# Patient Record
Sex: MALE | Race: WHITE | Employment: FULL TIME | URBAN - METROPOLITAN AREA
[De-identification: names, ages, dates, MRNs, and addresses within clinical notes are randomized per-mention and may not be internally consistent; named-entity substitution may affect disease eponyms.]

---

## 2017-05-31 DIAGNOSIS — M48.00 CENTRAL SPINAL STENOSIS: ICD-10-CM

## 2017-06-01 RX ORDER — CYCLOBENZAPRINE HCL 10 MG
TABLET ORAL
Qty: 20 TABLET | Refills: 0 | Status: SHIPPED | OUTPATIENT
Start: 2017-06-01 | End: 2017-06-28 | Stop reason: SDUPTHER

## 2017-06-28 ENCOUNTER — OFFICE VISIT (OUTPATIENT)
Dept: FAMILY MEDICINE CLINIC | Age: 42
End: 2017-06-28
Payer: COMMERCIAL

## 2017-06-28 VITALS
WEIGHT: 282 LBS | TEMPERATURE: 97.8 F | RESPIRATION RATE: 18 BRPM | SYSTOLIC BLOOD PRESSURE: 118 MMHG | BODY MASS INDEX: 36.19 KG/M2 | DIASTOLIC BLOOD PRESSURE: 64 MMHG | HEIGHT: 74 IN
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
## 2017-06-28 DIAGNOSIS — M54.6 ACUTE MIDLINE THORACIC BACK PAIN: ICD-10-CM

## 2017-06-28 DIAGNOSIS — M54.50 ACUTE MIDLINE LOW BACK PAIN WITHOUT SCIATICA: ICD-10-CM

## 2017-06-28 PROCEDURE — 99213 OFFICE O/P EST LOW 20 MIN: CPT | Performed by: NURSE PRACTITIONER

## 2017-06-28 RX ORDER — OXYCODONE HYDROCHLORIDE AND ACETAMINOPHEN 5; 325 MG/1; MG/1
1 TABLET ORAL
COMMUNITY
Start: 2017-06-20 | End: 2017-07-03 | Stop reason: CLARIF

## 2017-06-28 RX ORDER — CYCLOBENZAPRINE HCL 10 MG
10 TABLET ORAL
COMMUNITY
Start: 2017-06-20 | End: 2017-08-03 | Stop reason: SDUPTHER

## 2017-06-28 RX ORDER — IBUPROFEN 200 MG
200 TABLET ORAL
COMMUNITY
End: 2017-06-28 | Stop reason: SDUPTHER

## 2017-06-28 ASSESSMENT — ENCOUNTER SYMPTOMS
BACK PAIN: 1
SHORTNESS OF BREATH: 0
BOWEL INCONTINENCE: 0
ABDOMINAL PAIN: 0

## 2017-07-03 ENCOUNTER — TELEPHONE (OUTPATIENT)
Dept: FAMILY MEDICINE CLINIC | Age: 42
End: 2017-07-03
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
## 2017-07-03 RX ORDER — ACETAMINOPHEN AND CODEINE PHOSPHATE 300; 30 MG/1; MG/1
1-2 TABLET ORAL 2 TIMES DAILY PRN
Qty: 12 TABLET | Refills: 0 | Status: SHIPPED | OUTPATIENT
Start: 2017-07-03 | End: 2017-07-10 | Stop reason: SDUPTHER

## 2017-07-07 ENCOUNTER — TELEPHONE (OUTPATIENT)
Dept: FAMILY MEDICINE CLINIC | Age: 42
End: 2017-07-07

## 2017-07-10 ENCOUNTER — TELEPHONE (OUTPATIENT)
Dept: FAMILY MEDICINE CLINIC | Age: 42
End: 2017-07-10

## 2017-07-10 RX ORDER — ACETAMINOPHEN AND CODEINE PHOSPHATE 300; 30 MG/1; MG/1
1-2 TABLET ORAL 2 TIMES DAILY PRN
Qty: 12 TABLET | Refills: 0 | Status: SHIPPED | OUTPATIENT
Start: 2017-07-10 | End: 2018-01-16 | Stop reason: ALTCHOICE

## 2017-07-17 ENCOUNTER — TELEPHONE (OUTPATIENT)
Dept: FAMILY MEDICINE CLINIC | Age: 42
End: 2017-07-17

## 2017-07-20 ENCOUNTER — TELEPHONE (OUTPATIENT)
Dept: FAMILY MEDICINE CLINIC | Age: 42
End: 2017-07-20

## 2017-08-03 ENCOUNTER — OFFICE VISIT (OUTPATIENT)
Dept: FAMILY MEDICINE CLINIC | Age: 42
End: 2017-08-03
Payer: COMMERCIAL

## 2017-08-03 VITALS
WEIGHT: 289.2 LBS | TEMPERATURE: 96.2 F | SYSTOLIC BLOOD PRESSURE: 124 MMHG | BODY MASS INDEX: 37.12 KG/M2 | HEART RATE: 64 BPM | DIASTOLIC BLOOD PRESSURE: 82 MMHG | OXYGEN SATURATION: 98 % | HEIGHT: 74 IN

## 2017-08-03 DIAGNOSIS — M54.50 ACUTE LOW BACK PAIN DUE TO TRAUMA: Primary | ICD-10-CM

## 2017-08-03 DIAGNOSIS — M54.6 ACUTE MIDLINE THORACIC BACK PAIN: ICD-10-CM

## 2017-08-03 DIAGNOSIS — G89.11 ACUTE LOW BACK PAIN DUE TO TRAUMA: Primary | ICD-10-CM

## 2017-08-03 PROCEDURE — 99213 OFFICE O/P EST LOW 20 MIN: CPT | Performed by: NURSE PRACTITIONER

## 2017-08-03 RX ORDER — CYCLOBENZAPRINE HCL 10 MG
10 TABLET ORAL 3 TIMES DAILY PRN
Qty: 60 TABLET | Refills: 1 | Status: SHIPPED | OUTPATIENT
Start: 2017-08-03 | End: 2018-01-08 | Stop reason: SDUPTHER

## 2017-08-03 ASSESSMENT — ENCOUNTER SYMPTOMS
BOWEL INCONTINENCE: 0
ABDOMINAL PAIN: 0
BACK PAIN: 1
SHORTNESS OF BREATH: 0

## 2017-11-10 ENCOUNTER — NURSE ONLY (OUTPATIENT)
Dept: FAMILY MEDICINE CLINIC | Age: 42
End: 2017-11-10
Payer: COMMERCIAL

## 2017-11-10 VITALS — TEMPERATURE: 96.6 F

## 2017-11-10 DIAGNOSIS — Z23 FLU VACCINE NEED: Primary | ICD-10-CM

## 2017-11-10 PROCEDURE — 90471 IMMUNIZATION ADMIN: CPT | Performed by: NURSE PRACTITIONER

## 2017-11-10 PROCEDURE — 90630 INFLUENZA, QUADV, 18-64 YRS, ID, PF, MICRO INJ, 0.1ML (FLUZONE QUADV, PF): CPT | Performed by: NURSE PRACTITIONER

## 2017-11-10 NOTE — PROGRESS NOTES
After obtaining consent, and per orders of Vladimir Ibrahim CNP, injection of flu vaccine given in Left deltoid by Haris Hare. Patient instructed to remain in clinic for 20 minutes afterwards, and to report any adverse reaction to me immediately.

## 2018-01-08 RX ORDER — CYCLOBENZAPRINE HCL 10 MG
TABLET ORAL
Qty: 60 TABLET | Refills: 0 | Status: SHIPPED | OUTPATIENT
Start: 2018-01-08 | End: 2018-03-19 | Stop reason: SDUPTHER

## 2018-01-16 ENCOUNTER — OFFICE VISIT (OUTPATIENT)
Dept: FAMILY MEDICINE CLINIC | Age: 43
End: 2018-01-16
Payer: COMMERCIAL

## 2018-01-16 VITALS
TEMPERATURE: 99 F | WEIGHT: 292 LBS | RESPIRATION RATE: 18 BRPM | DIASTOLIC BLOOD PRESSURE: 76 MMHG | HEART RATE: 106 BPM | OXYGEN SATURATION: 98 % | BODY MASS INDEX: 37.47 KG/M2 | SYSTOLIC BLOOD PRESSURE: 134 MMHG | HEIGHT: 74 IN

## 2018-01-16 DIAGNOSIS — M79.18 MYOFASCIAL PAIN DYSFUNCTION SYNDROME: ICD-10-CM

## 2018-01-16 DIAGNOSIS — J34.89 SINUS PAIN: ICD-10-CM

## 2018-01-16 DIAGNOSIS — M26.69: ICD-10-CM

## 2018-01-16 DIAGNOSIS — R50.9 FEVER, UNSPECIFIED FEVER CAUSE: ICD-10-CM

## 2018-01-16 DIAGNOSIS — J34.89 SINUS PRESSURE: ICD-10-CM

## 2018-01-16 DIAGNOSIS — R05.9 COUGH: ICD-10-CM

## 2018-01-16 DIAGNOSIS — R52 BODY ACHES: ICD-10-CM

## 2018-01-16 DIAGNOSIS — R68.83 CHILLS: ICD-10-CM

## 2018-01-16 DIAGNOSIS — J01.11 ACUTE RECURRENT FRONTAL SINUSITIS: Primary | ICD-10-CM

## 2018-01-16 LAB
INFLUENZA A ANTIBODY: NEGATIVE
INFLUENZA B ANTIBODY: NEGATIVE

## 2018-01-16 PROCEDURE — 99213 OFFICE O/P EST LOW 20 MIN: CPT | Performed by: INTERNAL MEDICINE

## 2018-01-16 PROCEDURE — 87804 INFLUENZA ASSAY W/OPTIC: CPT | Performed by: INTERNAL MEDICINE

## 2018-01-16 RX ORDER — AMOXICILLIN AND CLAVULANATE POTASSIUM 875; 125 MG/1; MG/1
1 TABLET, FILM COATED ORAL 2 TIMES DAILY
Qty: 20 TABLET | Refills: 0 | Status: SHIPPED | OUTPATIENT
Start: 2018-01-16 | End: 2018-01-26

## 2018-01-16 ASSESSMENT — ENCOUNTER SYMPTOMS
SWOLLEN GLANDS: 1
SINUS PAIN: 1
NAUSEA: 0
VOICE CHANGE: 0
COLOR CHANGE: 0
STRIDOR: 0
WHEEZING: 0
VISUAL CHANGE: 0
ABDOMINAL PAIN: 0
CHEST TIGHTNESS: 0
SINUS PRESSURE: 1
TROUBLE SWALLOWING: 0
SHORTNESS OF BREATH: 0
SORE THROAT: 1
COUGH: 1
FACIAL SWELLING: 0
CHANGE IN BOWEL HABIT: 0
VOMITING: 0
RHINORRHEA: 0

## 2018-01-16 ASSESSMENT — PATIENT HEALTH QUESTIONNAIRE - PHQ9
SUM OF ALL RESPONSES TO PHQ9 QUESTIONS 1 & 2: 0
2. FEELING DOWN, DEPRESSED OR HOPELESS: 0
1. LITTLE INTEREST OR PLEASURE IN DOING THINGS: 0
SUM OF ALL RESPONSES TO PHQ QUESTIONS 1-9: 0

## 2018-01-16 NOTE — PROGRESS NOTES
color change and rash. Neurological: Positive for headaches. Negative for vertigo, weakness and numbness. Objective:     Physical Exam   Constitutional: He is oriented to person, place, and time. He appears well-developed and well-nourished. He is active. Non-toxic appearance. He has a sickly appearance. He appears ill. No distress. HENT:   Right Ear: Tympanic membrane, external ear and ear canal normal.   Left Ear: Tympanic membrane, external ear and ear canal normal.   Nose: Mucosal edema and rhinorrhea present. Right sinus exhibits no maxillary sinus tenderness and no frontal sinus tenderness. Left sinus exhibits maxillary sinus tenderness and frontal sinus tenderness. Mouth/Throat: Uvula is midline. Mucous membranes are dry. Posterior oropharyngeal edema and posterior oropharyngeal erythema present. Cardiovascular: Regular rhythm, S1 normal, S2 normal and normal heart sounds. Occasional extrasystoles are present. Tachycardia present. Exam reveals no gallop. No murmur heard. Pulmonary/Chest: Effort normal and breath sounds normal. No tachypnea. He has no decreased breath sounds. He has no wheezes. He has no rhonchi. He has no rales. Abdominal: Soft. Bowel sounds are normal. There is no splenomegaly or hepatomegaly. There is no tenderness. Lymphadenopathy:        Head (left side): Submental and submandibular adenopathy present. He has cervical adenopathy. Left cervical: Superficial cervical adenopathy present. Neurological: He is alert and oriented to person, place, and time. Skin: Skin is warm and dry. No rash noted. Psychiatric: He has a normal mood and affect. Nursing note and vitals reviewed. /76 (Site: Left Arm, Position: Sitting, Cuff Size: Large Adult)   Pulse 106   Temp 99 °F (37.2 °C) (Tympanic)   Resp 18   Ht 6' 2.02\" (1.88 m)   Wt 292 lb (132.5 kg)   SpO2 98%   BMI 37.47 kg/m²     Assessment:      1. Acute recurrent frontal sinusitis     2. Body aches  POCT Influenza A/B   3. Chills     4. Fever, unspecified fever cause     5. Sinus pain     6. Sinus pressure     7. Cough     8. Myofascial pain dysfunction syndrome  External Referral To Oral Maxillofacial Surgery   9. Mandibular dysfunction  External Referral To Oral Maxillofacial Surgery             Plan:      Return if symptoms worsen or fail to improve. Orders Placed This Encounter   Procedures    External Referral To Oral Maxillofacial Surgery     Referral Priority:   Routine     Referral Type:   Consult for Advice and Opinion     Referral Reason:   Specialty Services Required     Referred to Provider:   [address] [place] [address],[address] Department Of Oral And Maxillofacial Surgery/Dentistry     Requested Specialty:   Oral Surgery     Number of Visits Requested:   1    POCT Influenza A/B     Orders Placed This Encounter   Medications    amoxicillin-clavulanate (AUGMENTIN) 875-125 MG per tablet     Sig: Take 1 tablet by mouth 2 times daily for 10 days     Dispense:  20 tablet     Refill:  0    Influenza Neg. Patient sensitive to medications so treated as acute case of sinusitis but not given any other medication besides Augmentin  . Advised pt to use jose m pot and humidifier. Also try saline nasal spray and can alternate Advil and tylenol for body aches and /or fevers. Plenty of rest and fluids  Call if symptoms persist or worsen. Patient given educational materials - see patient instructions. Discussed use, benefit, and side effects of prescribed medications. All patient questions answered. Pt voiced understanding. Reviewed health maintenance. Instructed to continue current medications, diet and exercise. Patient agreed with treatment plan. Follow up as directed.      Electronically signed by Ed Guaman DO on 1/16/2018 at 4:50 PM

## 2018-03-19 RX ORDER — CYCLOBENZAPRINE HCL 10 MG
TABLET ORAL
Qty: 60 TABLET | Refills: 0 | Status: SHIPPED | OUTPATIENT
Start: 2018-03-19 | End: 2018-05-17 | Stop reason: SDUPTHER

## 2018-03-22 ENCOUNTER — OFFICE VISIT (OUTPATIENT)
Dept: FAMILY MEDICINE CLINIC | Age: 43
End: 2018-03-22
Payer: COMMERCIAL

## 2018-03-22 VITALS
HEIGHT: 74 IN | OXYGEN SATURATION: 97 % | DIASTOLIC BLOOD PRESSURE: 72 MMHG | SYSTOLIC BLOOD PRESSURE: 116 MMHG | TEMPERATURE: 99 F | HEART RATE: 101 BPM | WEIGHT: 292 LBS | BODY MASS INDEX: 37.47 KG/M2

## 2018-03-22 DIAGNOSIS — R50.9 FEVER, UNSPECIFIED FEVER CAUSE: ICD-10-CM

## 2018-03-22 DIAGNOSIS — J01.11 ACUTE RECURRENT FRONTAL SINUSITIS: Primary | ICD-10-CM

## 2018-03-22 LAB
INFLUENZA A ANTIBODY: NEGATIVE
INFLUENZA B ANTIBODY: NEGATIVE

## 2018-03-22 PROCEDURE — 99213 OFFICE O/P EST LOW 20 MIN: CPT | Performed by: NURSE PRACTITIONER

## 2018-03-22 PROCEDURE — 87804 INFLUENZA ASSAY W/OPTIC: CPT | Performed by: NURSE PRACTITIONER

## 2018-03-22 RX ORDER — CEFUROXIME AXETIL 500 MG/1
500 TABLET ORAL 2 TIMES DAILY
Qty: 20 TABLET | Refills: 0 | Status: SHIPPED | OUTPATIENT
Start: 2018-03-22 | End: 2018-04-01

## 2018-03-22 ASSESSMENT — ENCOUNTER SYMPTOMS
VOMITING: 0
RHINORRHEA: 1
SHORTNESS OF BREATH: 0
SORE THROAT: 1
ABDOMINAL PAIN: 0
SINUS PAIN: 1
SINUS COMPLAINT: 1
COUGH: 1
NAUSEA: 1
CHEST TIGHTNESS: 0
SINUS PRESSURE: 1
TROUBLE SWALLOWING: 0

## 2018-03-22 NOTE — PROGRESS NOTES
(ADVIL;MOTRIN) 800 MG tablet       levothyroxine (SYNTHROID) 150 MCG tablet        Current Facility-Administered Medications   Medication Dose Route Frequency Provider Last Rate Last Dose    albuterol (PROVENTIL) nebulizer solution 2.5 mg  2.5 mg Nebulization Q6H PRN Anisha Hollis CNP         Allergies   Allergen Reactions    Levaquin [Levofloxacin] Anaphylaxis, Hives, Other (See Comments) and Rash    Augmentin [Amoxicillin-Pot Clavulanate] Hives    Gatifloxacin      Hives    Other Hives and Other (See Comments)     tequin       Health Maintenance   Topic Date Due    Diabetes screen  04/07/2019    DTaP/Tdap/Td vaccine (2 - Td) 03/01/2020    Lipid screen  04/07/2021    Flu vaccine  Completed    HIV screen  Addressed       Subjective:      Review of Systems   Constitutional: Positive for activity change, appetite change, chills, fatigue and fever. Negative for diaphoresis and unexpected weight change. HENT: Positive for congestion, postnasal drip, rhinorrhea, sinus pain, sinus pressure and sore throat. Negative for ear discharge, ear pain, hearing loss, sneezing and trouble swallowing. Respiratory: Positive for cough. Negative for chest tightness and shortness of breath. Cardiovascular: Negative for chest pain and palpitations. Gastrointestinal: Positive for nausea. Negative for abdominal pain and vomiting. Neurological: Positive for light-headedness and headaches. Negative for dizziness. Hematological: Negative for adenopathy. Psychiatric/Behavioral: Positive for sleep disturbance. Objective:     Physical Exam   Constitutional: He is oriented to person, place, and time. He appears well-developed and well-nourished. No distress. /72 (Site: Right Arm, Position: Sitting, Cuff Size: Medium Adult)   Pulse 101   Temp 99 °F (37.2 °C) (Tympanic)   Ht 6' 2.02\" (1.88 m)   Wt 292 lb (132.5 kg)   SpO2 97%   BMI 37.47 kg/m²      HENT:   Head: Normocephalic and atraumatic.    Right

## 2018-04-09 ENCOUNTER — PATIENT MESSAGE (OUTPATIENT)
Dept: FAMILY MEDICINE CLINIC | Age: 43
End: 2018-04-09

## 2018-04-09 ENCOUNTER — OFFICE VISIT (OUTPATIENT)
Dept: FAMILY MEDICINE CLINIC | Age: 43
End: 2018-04-09
Payer: COMMERCIAL

## 2018-04-09 VITALS
SYSTOLIC BLOOD PRESSURE: 116 MMHG | DIASTOLIC BLOOD PRESSURE: 82 MMHG | OXYGEN SATURATION: 98 % | HEIGHT: 74 IN | TEMPERATURE: 98 F | HEART RATE: 83 BPM | WEIGHT: 287 LBS | BODY MASS INDEX: 36.83 KG/M2

## 2018-04-09 DIAGNOSIS — R51.9 ACUTE NONINTRACTABLE HEADACHE, UNSPECIFIED HEADACHE TYPE: ICD-10-CM

## 2018-04-09 DIAGNOSIS — R05.9 COUGH: ICD-10-CM

## 2018-04-09 DIAGNOSIS — R52 BODY ACHES: ICD-10-CM

## 2018-04-09 DIAGNOSIS — J40 BRONCHITIS: Primary | ICD-10-CM

## 2018-04-09 LAB
INFLUENZA A ANTIBODY: NEGATIVE
INFLUENZA B ANTIBODY: NEGATIVE

## 2018-04-09 PROCEDURE — 87804 INFLUENZA ASSAY W/OPTIC: CPT | Performed by: NURSE PRACTITIONER

## 2018-04-09 PROCEDURE — 99213 OFFICE O/P EST LOW 20 MIN: CPT | Performed by: NURSE PRACTITIONER

## 2018-04-09 RX ORDER — DEXTROMETHORPHAN HYDROBROMIDE AND PROMETHAZINE HYDROCHLORIDE 15; 6.25 MG/5ML; MG/5ML
5 SYRUP ORAL 4 TIMES DAILY PRN
Qty: 150 ML | Refills: 0 | Status: SHIPPED | OUTPATIENT
Start: 2018-04-09 | End: 2018-04-16

## 2018-04-09 RX ORDER — LEVALBUTEROL 1.25 MG/.5ML
1.25 SOLUTION, CONCENTRATE RESPIRATORY (INHALATION) EVERY 4 HOURS PRN
Qty: 30 EACH | Refills: 1 | Status: SHIPPED | OUTPATIENT
Start: 2018-04-09 | End: 2019-03-05

## 2018-04-09 RX ORDER — METHYLPREDNISOLONE 4 MG/1
TABLET ORAL
Qty: 1 KIT | Refills: 0 | Status: SHIPPED | OUTPATIENT
Start: 2018-04-09 | End: 2018-06-25 | Stop reason: SDUPTHER

## 2018-04-09 ASSESSMENT — ENCOUNTER SYMPTOMS
CHEST TIGHTNESS: 1
NAUSEA: 0
COUGH: 1
TROUBLE SWALLOWING: 0
RHINORRHEA: 1
VOMITING: 0
SINUS PAIN: 1
ABDOMINAL PAIN: 0
SORE THROAT: 0
SHORTNESS OF BREATH: 0
SINUS PRESSURE: 1

## 2018-04-10 ENCOUNTER — PATIENT MESSAGE (OUTPATIENT)
Dept: FAMILY MEDICINE CLINIC | Age: 43
End: 2018-04-10

## 2018-05-17 RX ORDER — CYCLOBENZAPRINE HCL 10 MG
TABLET ORAL
Qty: 60 TABLET | Refills: 0 | Status: SHIPPED | OUTPATIENT
Start: 2018-05-17 | End: 2018-08-20 | Stop reason: SDUPTHER

## 2018-06-25 ENCOUNTER — OFFICE VISIT (OUTPATIENT)
Dept: FAMILY MEDICINE CLINIC | Age: 43
End: 2018-06-25
Payer: COMMERCIAL

## 2018-06-25 VITALS
RESPIRATION RATE: 17 BRPM | WEIGHT: 283 LBS | OXYGEN SATURATION: 97 % | BODY MASS INDEX: 36.32 KG/M2 | HEART RATE: 70 BPM | SYSTOLIC BLOOD PRESSURE: 126 MMHG | DIASTOLIC BLOOD PRESSURE: 74 MMHG | HEIGHT: 74 IN | TEMPERATURE: 97 F

## 2018-06-25 DIAGNOSIS — M25.571 ARTHRALGIA OF TOE OF RIGHT FOOT: Primary | ICD-10-CM

## 2018-06-25 DIAGNOSIS — M25.40 JOINT SWELLING: ICD-10-CM

## 2018-06-25 DIAGNOSIS — I73.00 RAYNAUD'S DISEASE WITHOUT GANGRENE: ICD-10-CM

## 2018-06-25 DIAGNOSIS — M75.42 IMPINGEMENT SYNDROME OF LEFT SHOULDER: ICD-10-CM

## 2018-06-25 DIAGNOSIS — M25.512 CHRONIC LEFT SHOULDER PAIN: ICD-10-CM

## 2018-06-25 DIAGNOSIS — G89.29 CHRONIC LEFT SHOULDER PAIN: ICD-10-CM

## 2018-06-25 PROCEDURE — 99214 OFFICE O/P EST MOD 30 MIN: CPT | Performed by: INTERNAL MEDICINE

## 2018-06-25 RX ORDER — SULFAMETHOXAZOLE AND TRIMETHOPRIM 800; 160 MG/1; MG/1
1 TABLET ORAL 2 TIMES DAILY
Qty: 14 TABLET | Refills: 0 | Status: SHIPPED | OUTPATIENT
Start: 2018-06-25 | End: 2018-07-02

## 2018-06-25 RX ORDER — LEVALBUTEROL INHALATION SOLUTION 1.25 MG/3ML
SOLUTION RESPIRATORY (INHALATION)
COMMUNITY
Start: 2018-04-09 | End: 2019-03-05

## 2018-06-25 RX ORDER — METHYLPREDNISOLONE 4 MG/1
TABLET ORAL
Qty: 1 KIT | Refills: 0 | Status: SHIPPED | OUTPATIENT
Start: 2018-06-25 | End: 2019-03-05

## 2018-06-25 ASSESSMENT — ENCOUNTER SYMPTOMS
BLOOD IN STOOL: 0
COUGH: 0
ABDOMINAL PAIN: 0
BACK PAIN: 0
SWOLLEN GLANDS: 0
SORE THROAT: 0
NAUSEA: 0
SHORTNESS OF BREATH: 0
COLOR CHANGE: 1
CHOKING: 0
WHEEZING: 0
DIARRHEA: 0
CHANGE IN BOWEL HABIT: 0
VOICE CHANGE: 0
CONSTIPATION: 0
TROUBLE SWALLOWING: 0
STRIDOR: 0
VISUAL CHANGE: 0
CHEST TIGHTNESS: 0

## 2018-06-26 ENCOUNTER — HOSPITAL ENCOUNTER (OUTPATIENT)
Age: 43
Setting detail: SPECIMEN
Discharge: HOME | End: 2018-06-26
Payer: COMMERCIAL

## 2018-06-26 DIAGNOSIS — G89.29 CHRONIC LEFT SHOULDER PAIN: ICD-10-CM

## 2018-06-26 DIAGNOSIS — M25.571 ARTHRALGIA OF TOE OF RIGHT FOOT: ICD-10-CM

## 2018-06-26 DIAGNOSIS — M25.40 JOINT SWELLING: ICD-10-CM

## 2018-06-26 DIAGNOSIS — M25.512 CHRONIC LEFT SHOULDER PAIN: ICD-10-CM

## 2018-06-26 LAB
ABSOLUTE EOS #: 0.12 K/UL (ref 0–0.44)
ABSOLUTE IMMATURE GRANULOCYTE: <0.03 K/UL (ref 0–0.3)
ABSOLUTE LYMPH #: 1.42 K/UL (ref 1.1–3.7)
ABSOLUTE MONO #: 0.33 K/UL (ref 0.1–1.2)
BASOPHILS # BLD: 1 % (ref 0–2)
BASOPHILS ABSOLUTE: 0.03 K/UL (ref 0–0.2)
DIFFERENTIAL TYPE: ABNORMAL
EOSINOPHILS RELATIVE PERCENT: 2 % (ref 1–4)
HCT VFR BLD CALC: 41.6 % (ref 40.7–50.3)
HEMOGLOBIN: 13.1 G/DL (ref 13–17)
IMMATURE GRANULOCYTES: 0 %
LYMPHOCYTES # BLD: 23 % (ref 24–43)
MCH RBC QN AUTO: 27.4 PG (ref 25.2–33.5)
MCHC RBC AUTO-ENTMCNC: 31.5 G/DL (ref 28.4–34.8)
MCV RBC AUTO: 87 FL (ref 82.6–102.9)
MONOCYTES # BLD: 5 % (ref 3–12)
NRBC AUTOMATED: 0 PER 100 WBC
PDW BLD-RTO: 13.2 % (ref 11.8–14.4)
PLATELET # BLD: 179 K/UL (ref 138–453)
PLATELET ESTIMATE: ABNORMAL
PMV BLD AUTO: 11.9 FL (ref 8.1–13.5)
RBC # BLD: 4.78 M/UL (ref 4.21–5.77)
RBC # BLD: ABNORMAL 10*6/UL
RHEUMATOID FACTOR: <10 IU/ML
SEDIMENTATION RATE, ERYTHROCYTE: 10 MM (ref 0–10)
SEG NEUTROPHILS: 69 % (ref 36–65)
SEGMENTED NEUTROPHILS ABSOLUTE COUNT: 4.38 K/UL (ref 1.5–8.1)
URIC ACID: 5.8 MG/DL (ref 3.4–7)
WBC # BLD: 6.3 K/UL (ref 3.5–11.3)
WBC # BLD: ABNORMAL 10*3/UL

## 2018-06-27 ENCOUNTER — PATIENT MESSAGE (OUTPATIENT)
Dept: FAMILY MEDICINE CLINIC | Age: 43
End: 2018-06-27

## 2018-06-27 LAB — ANTI-NUCLEAR ANTIBODY (ANA): NEGATIVE

## 2018-06-28 RX ORDER — CEPHALEXIN 500 MG/1
500 CAPSULE ORAL 3 TIMES DAILY
Qty: 15 CAPSULE | Refills: 0 | Status: SHIPPED | OUTPATIENT
Start: 2018-06-28 | End: 2018-07-03

## 2018-07-19 ENCOUNTER — OFFICE VISIT (OUTPATIENT)
Dept: FAMILY MEDICINE CLINIC | Age: 43
End: 2018-07-19
Payer: COMMERCIAL

## 2018-07-19 VITALS
WEIGHT: 281 LBS | HEIGHT: 74 IN | SYSTOLIC BLOOD PRESSURE: 143 MMHG | DIASTOLIC BLOOD PRESSURE: 78 MMHG | BODY MASS INDEX: 36.06 KG/M2

## 2018-07-19 DIAGNOSIS — M25.512 ACUTE PAIN OF LEFT SHOULDER: Primary | ICD-10-CM

## 2018-07-19 PROCEDURE — 99213 OFFICE O/P EST LOW 20 MIN: CPT | Performed by: NURSE PRACTITIONER

## 2018-07-19 RX ORDER — ACETAMINOPHEN AND CODEINE PHOSPHATE 300; 30 MG/1; MG/1
1-2 TABLET ORAL 2 TIMES DAILY PRN
Qty: 14 TABLET | Refills: 0 | Status: SHIPPED | OUTPATIENT
Start: 2018-07-19 | End: 2018-08-03 | Stop reason: SDUPTHER

## 2018-07-19 ASSESSMENT — ENCOUNTER SYMPTOMS
BACK PAIN: 0
COLOR CHANGE: 0
SHORTNESS OF BREATH: 0

## 2018-07-19 NOTE — PROGRESS NOTES
[address]  [place], [address]  [phone]      Zack Avendano is a 39 y.o. male who presents today for his  medical conditions/complaints as noted below. Zack Avendano is c/o of Shoulder Pain (left,req referral to ortho,seen by Becca Hernandez few wks ago, doing PT but still having alot of pain,shoulder made popping sound during new exercise today, went gray and therapist thought he was going to pass out)  . HPI:   Pt here for referral to ortho for ongoing acute shoulder pain. He was seen on 6/25/18 and had xray done which did not show any acute changes. He has been doing PT since then and was having mild improvement In pain until today when he felt a loud pop and had pain shoot to an 8, has reduced to a 6 now and would like something for pain. He had had no injury. He was told by the PT that the muscles are weak in posterior shoulder. Past Medical History:   Diagnosis Date    Anxiety     Back pain     Cellulitis     Central spinal stenosis     Cough     Diarrhea     Esophageal reflux     Fatigue     Headache(784.0)     Hypertension     Hypogonadism     Hypothyroidism     Knee pain     Palpitations       Past Surgical History:   Procedure Laterality Date    BACK SURGERY      CHOLECYSTECTOMY      KNEE SURGERY      NOSE SURGERY      SHOULDER SURGERY      THYROIDECTOMY      TONSILLECTOMY       Family History   Problem Relation Age of Onset    Heart Disease Mother     Other Father         thyroid dysfunction     Social History   Substance Use Topics    Smoking status: Former Smoker    Smokeless tobacco: Never Used    Alcohol use Yes      Current Outpatient Prescriptions   Medication Sig Dispense Refill    acetaminophen-codeine (TYLENOL #3) 300-30 MG per tablet Take 1-2 tablets by mouth 2 times daily as needed for Pain for up to 7 days. . 14 tablet 0    levalbuterol (XOPENEX) 1.25 MG/3ML nebulizer solution       methylPREDNISolone (MEDROL, MARI) 4 MG tablet Take by mouth. 1 kit 0    cyclobenzaprine (FLEXERIL) 10 MG tablet TAKE ONE TABLET BY MOUTH THREE TIMES A DAY AS NEEDED FOR MUSCLE SPASMS 60 tablet 0    levalbuterol (XOPENEX) 1.25 MG/0.5ML nebulizer solution Take 0.5 mLs by nebulization every 4 hours as needed for Wheezing 30 each 1    levothyroxine (SYNTHROID) 150 MCG tablet        Current Facility-Administered Medications   Medication Dose Route Frequency Provider Last Rate Last Dose    albuterol (PROVENTIL) nebulizer solution 2.5 mg  2.5 mg Nebulization Q6H PRN Geovany [place], APRN - CNP         Allergies   Allergen Reactions    Levaquin [Levofloxacin] Anaphylaxis, Hives, Other (See Comments) and Rash    Amoxicillin Hives and Other (See Comments)     Mouth ulcers    Augmentin [Amoxicillin-Pot Clavulanate] Hives    Gatifloxacin      Hives    Other Hives and Other (See Comments)     tequin       Health Maintenance   Topic Date Due    Flu vaccine (1) 09/01/2018    Diabetes screen  04/07/2019    DTaP/Tdap/Td vaccine (2 - Td) 03/01/2020    Lipid screen  04/07/2021    HIV screen  Addressed       Subjective:      Review of Systems   Constitutional: Positive for activity change. Negative for fatigue. Respiratory: Negative for shortness of breath. Cardiovascular: Negative for chest pain. Musculoskeletal: Positive for arthralgias. Negative for back pain, gait problem, joint swelling, neck pain and neck stiffness. Skin: Negative for color change, pallor and wound. Neurological: Negative for weakness and light-headedness. Psychiatric/Behavioral: Positive for sleep disturbance. Objective:     Physical Exam   Constitutional: He is oriented to person, place, and time. He appears well-developed and well-nourished. No distress. HENT:   Head: Normocephalic and atraumatic. Neck: Normal range of motion. Neck supple. Cardiovascular: Intact distal pulses.     Musculoskeletal: He exhibits tenderness (left shoulder ac joint and Attestation The Prescription Monitoring Report for this patient was reviewed today. Documentation Possible medication side effects, risk of tolerance/dependence & alternative treatments discussed. ;No signs of potential drug abuse or diversion identified. Short term tylenol 3 for pain  Call ortho for appt today, he has done prev. Right shoulder surgery on him  Disc given for referral  Continue rest and ROM as johnnie    Patient given educational materials - see patient instructions. Discussed use, benefit, and side effects of prescribed medications. All patient questions answered. Pt voiced understanding. Reviewed health maintenance. Instructed to continue current medications, diet and exercise.     Electronically signed by Driss Castro CNP on 7/19/2018 at 2:13 PM

## 2018-07-19 NOTE — PROGRESS NOTES
Visit Information    Have you changed or started any medications since your last visit including any over-the-counter medicines, vitamins, or herbal medicines? no   Have you stopped taking any of your medications? Is so, why? -  no  Are you having any side effects from any of your medications? - no    Have you seen any other physician or provider since your last visit?  no   Have you had any other diagnostic tests since your last visit?  no   Have you been seen in the emergency room and/or had an admission in a hospital since we last saw you?  no   Have you had your routine dental cleaning in the past 6 months?  no     Do you have an active MyChart account? If no, what is the barrier?   Yes    Patient Care Team:  TERENCE Brooks CNP as PCP - General (Nurse Practitioner)    Medical History Review  Past Medical, Family, and Social History reviewed and  contribute to the patient presenting condition    Health Maintenance   Topic Date Due    Flu vaccine (1) 09/01/2018    Diabetes screen  04/07/2019    DTaP/Tdap/Td vaccine (2 - Td) 03/01/2020    Lipid screen  04/07/2021    HIV screen  Addressed

## 2018-08-03 ENCOUNTER — PATIENT MESSAGE (OUTPATIENT)
Dept: FAMILY MEDICINE CLINIC | Age: 43
End: 2018-08-03

## 2018-08-03 DIAGNOSIS — M25.512 ACUTE PAIN OF LEFT SHOULDER: ICD-10-CM

## 2018-08-03 RX ORDER — ACETAMINOPHEN AND CODEINE PHOSPHATE 300; 30 MG/1; MG/1
1-2 TABLET ORAL 2 TIMES DAILY PRN
Qty: 14 TABLET | Refills: 0 | Status: SHIPPED | OUTPATIENT
Start: 2018-08-03 | End: 2018-08-22 | Stop reason: SDUPTHER

## 2018-08-22 ENCOUNTER — PATIENT MESSAGE (OUTPATIENT)
Dept: FAMILY MEDICINE CLINIC | Age: 43
End: 2018-08-22

## 2018-08-22 DIAGNOSIS — M25.512 ACUTE PAIN OF LEFT SHOULDER: ICD-10-CM

## 2018-08-22 RX ORDER — ACETAMINOPHEN AND CODEINE PHOSPHATE 300; 30 MG/1; MG/1
1-2 TABLET ORAL 2 TIMES DAILY PRN
Qty: 6 TABLET | Refills: 0 | Status: SHIPPED | OUTPATIENT
Start: 2018-08-22 | End: 2018-08-30 | Stop reason: SDUPTHER

## 2018-08-22 NOTE — TELEPHONE ENCOUNTER
From: Julissa Sandoval  To: Zinafátima Zhao, APRN - CNP  Sent: 8/22/2018 11:31 AM EDT  Subject: Non-Urgent Medical Question    Good morning Ny Lerma. With my shoulder, I have been taking the Tylenol 3 as needed and supplementing with Advil to control the pain. Its been working, however, I have one Tylenol 3 left. Is it possible to have a couple more Tylenol 3? I have my appt with Dr Bay Martin tomorrow and he should be able to take it over from there. Thank you very much. If you would like to discuss, please give me a shout on my phone.      Thank you very much,     Madhu  [phone]

## 2018-08-23 ENCOUNTER — PATIENT MESSAGE (OUTPATIENT)
Dept: FAMILY MEDICINE CLINIC | Age: 43
End: 2018-08-23

## 2018-08-30 ENCOUNTER — PATIENT MESSAGE (OUTPATIENT)
Dept: FAMILY MEDICINE CLINIC | Age: 43
End: 2018-08-30

## 2018-08-30 DIAGNOSIS — M25.512 ACUTE PAIN OF LEFT SHOULDER: ICD-10-CM

## 2018-08-30 RX ORDER — ACETAMINOPHEN AND CODEINE PHOSPHATE 300; 30 MG/1; MG/1
1-2 TABLET ORAL 2 TIMES DAILY PRN
Qty: 14 TABLET | Refills: 0 | Status: SHIPPED | OUTPATIENT
Start: 2018-08-30 | End: 2018-09-12 | Stop reason: SDUPTHER

## 2018-08-30 NOTE — TELEPHONE ENCOUNTER
From: Deo Interiano  To: TERENCE Bustamante CNP  Sent: 8/30/2018 12:24 PM EDT  Subject: Non-Urgent Medical Question    Chidi López, I hope that all is well. Basis my earlier email. .. I did the MRI at [facility] last Monday and have my follow up with Dr Marbella Prado on 9/12/18. I have limited what I am doing with my left arm, am still hitting the Advil pretty hard, and am still using the arm sling as I can. I have used the Tylenol 3 as needed on the worst days and used my last one a couple of days ago. As I mentioned in the earlier email, would it be possible to get a refill on Tylenol 3? Also, my stomach is really starting to hurt and I think it is from all of the Advil. I have been taking between 6 and 12 advil liquid gel pills a day for the pain. Tums seems to help. Any thoughts on reducing the stomach pain? Thank you  Kirkwood Holter  [phone]  ----- Message -----  From: TERENCE Bustamante CNP  Sent: 8/24/2018 8:54 AM EDT  To: Deo Interiano  Subject: RE: Non-Urgent Medical Question  Dnaya Loges and yes that is fine  albert    ----- Message -----   From: Deo Interiano   Sent: 8/23/2018 2:25 PM EDT   To: TERENCE Bustamante CNP  Subject: Non-Urgent Medical Question    Thank you Chidi López. I greatly appreciate it. I had my appt with Dr Janeth Pike today. He ordered an MRI, which is scheduled for this Monday. He thinks that I have a slap tear or labrum tear in my shoulder. I have a follow-up appt with Dr Janeth Pike on Sept 12th to review the MRI. When I asked about pain control in the meantime, he said to keep doing what I am doing and walked away to his next appt. So, I take that to mean, plenty of Advil, keeping my arm in a sling when I can, and Tylenol 3 on the worst days. If I run out the tylenol in the next week or so, can I send you another request?     Also, I will keep you posted as this progresses.      Thank you     Kirkwood Holter  [phone]  ----- Message -----  From: TERENCE Bustamante CNP  Sent: 8/22/2018 11:40 AM EDT  To: Franky Alexander  Subject: RE: Non-Urgent Medical Question  [id number] Crista Hayes I sent a few more over  albert    ----- Message -----   From: Franky Alexander   Sent: 8/22/2018 11:31 AM EDT   To: TERENCE Weaver - CNP  Subject: Non-Urgent Medical Question    Good morning [facility]. With my shoulder, I have been taking the Tylenol 3 as needed and supplementing with Advil to control the pain. Its been working, however, I have one Tylenol 3 left. Is it possible to have a couple more Tylenol 3? I have my appt with Dr Theresa Whiting tomorrow and he should be able to take it over from there. Thank you very much. If you would like to discuss, please give me a shout on my phone.      Thank you very much,     Lauren Sarmiento  [phone]

## 2018-08-31 ENCOUNTER — PATIENT MESSAGE (OUTPATIENT)
Dept: FAMILY MEDICINE CLINIC | Age: 43
End: 2018-08-31

## 2018-08-31 NOTE — TELEPHONE ENCOUNTER
starting to hurt and I think it is from all of the Advil. I have been taking between 6 and 12 advil liquid gel pills a day for the pain. Tums seems to help. Any thoughts on reducing the stomach pain? Thank you  Jeremy Wilkinsam  [phone]  ----- Message -----  From: TERENCE Saldaña CNP  Sent: 8/24/2018 8:54 AM EDT  To: Geoffrey Valencia  Subject: RE: Non-Urgent Medical Question  Eze Whitten and yes that is eva price    ----- Message -----   From: Geoffrey [facility] [place]   Sent: 8/23/2018 2:25 PM EDT   To: TERENCE Saldaña CNP  Subject: Non-Urgent Medical Question    Thank you Vasu Hernández. I greatly appreciate it. I had my appt with Dr Katie Delcid today. He ordered an MRI, which is scheduled for this Monday. He thinks that I have a slap tear or labrum tear in my shoulder. I have a follow-up appt with Dr Katie Delcid on Sept 12th to review the MRI. When I asked about pain control in the meantime, he said to keep doing what I am doing and walked away to his next appt. So, I take that to mean, plenty of Advil, keeping my arm in a sling when I can, and Tylenol 3 on the worst days. If I run out the tylenol in the next week or so, can I send you another request?     Also, I will keep you posted as this progresses. Thank you     Jeremy Haines  [phone]  ----- Message -----  From: TERENCE Saldaña CNP  Sent: 8/22/2018 11:40 AM EDT  To: Geoffrey Valencia  Subject: RE: Non-Urgent Medical Question  Eze Whitten I sent a few more over  albert    ----- Message -----   From: Geoffrey Valencia   Sent: 8/22/2018 11:31 AM EDT   To: TERENCE Saldaña CNP  Subject: Non-Urgent Medical Question    Good morning Vasu Hernández. With my shoulder, I have been taking the Tylenol 3 as needed and supplementing with Advil to control the pain. Its been working, however, I have one Tylenol 3 left. Is it possible to have a couple more Tylenol 3? I have my appt with Dr Katie Delcid tomorrow and he should be able to take it over from there. Thank you very much.  If you would like to discuss, please give me a shout on my phone.      Thank you very much,     Radha Weldon  [phone]

## 2018-09-12 ENCOUNTER — PATIENT MESSAGE (OUTPATIENT)
Dept: FAMILY MEDICINE CLINIC | Age: 43
End: 2018-09-12

## 2018-09-12 DIAGNOSIS — M25.512 ACUTE PAIN OF LEFT SHOULDER: ICD-10-CM

## 2018-09-12 DIAGNOSIS — S43.439D SUPERIOR GLENOID LABRUM LESION OF SHOULDER, UNSPECIFIED LATERALITY, SUBSEQUENT ENCOUNTER: Primary | ICD-10-CM

## 2018-09-12 RX ORDER — ACETAMINOPHEN AND CODEINE PHOSPHATE 300; 30 MG/1; MG/1
1-2 TABLET ORAL 2 TIMES DAILY PRN
Qty: 60 TABLET | Refills: 0 | Status: SHIPPED | OUTPATIENT
Start: 2018-09-12 | End: 2020-03-17 | Stop reason: SDUPTHER

## 2018-09-12 NOTE — TELEPHONE ENCOUNTER
pain control in the meantime, he said to keep doing what I am doing and walked away to his next appt. So, I take that to mean, plenty of Advil, keeping my arm in a sling when I can, and Tylenol 3 on the worst days. If I run out the tylenol in the next week or so, can I send you another request?     Also, I will keep you posted as this progresses. Thank you     Tatianaclari Crew  [phone]  ----- Message -----  From: TERENCE Márquez CNP  Sent: 8/22/2018 11:40 AM EDT  To: Val Mclean  Subject: RE: Non-Urgent Medical Question  Calixto Valles I sent a few more over  albert    ----- Message -----   From: Val Mclean   Sent: 8/22/2018 11:31 AM EDT   To: TERECNE Márquez CNP  Subject: Non-Urgent Medical Question    Good morning Drewtamara Quintero. With my shoulder, I have been taking the Tylenol 3 as needed and supplementing with Advil to control the pain. Its been working, however, I have one Tylenol 3 left. Is it possible to have a couple more Tylenol 3? I have my appt with Dr Prince Reilly tomorrow and he should be able to take it over from there. Thank you very much. If you would like to discuss, please give me a shout on my phone.      Thank you very much,     Sofia Crew  [phone]

## 2019-02-21 RX ORDER — CYCLOBENZAPRINE HCL 10 MG
TABLET ORAL
Qty: 60 TABLET | Refills: 0 | Status: SHIPPED | OUTPATIENT
Start: 2019-02-21 | End: 2019-04-23 | Stop reason: SDUPTHER

## 2019-03-05 ENCOUNTER — HOSPITAL ENCOUNTER (OUTPATIENT)
Age: 44
Setting detail: SPECIMEN
Discharge: HOME | End: 2019-03-05
Payer: COMMERCIAL

## 2019-03-05 ENCOUNTER — OFFICE VISIT (OUTPATIENT)
Dept: FAMILY MEDICINE CLINIC | Age: 44
End: 2019-03-05
Payer: COMMERCIAL

## 2019-03-05 VITALS
TEMPERATURE: 97.6 F | DIASTOLIC BLOOD PRESSURE: 72 MMHG | HEART RATE: 85 BPM | HEIGHT: 74 IN | OXYGEN SATURATION: 95 % | WEIGHT: 288 LBS | SYSTOLIC BLOOD PRESSURE: 133 MMHG | BODY MASS INDEX: 36.96 KG/M2

## 2019-03-05 DIAGNOSIS — R05.9 COUGH: ICD-10-CM

## 2019-03-05 DIAGNOSIS — J04.0 LARYNGITIS: Primary | ICD-10-CM

## 2019-03-05 DIAGNOSIS — R53.83 FATIGUE, UNSPECIFIED TYPE: ICD-10-CM

## 2019-03-05 DIAGNOSIS — G44.52 NEW DAILY PERSISTENT HEADACHE: ICD-10-CM

## 2019-03-05 DIAGNOSIS — M25.562 ACUTE PAIN OF LEFT KNEE: ICD-10-CM

## 2019-03-05 DIAGNOSIS — J02.9 SORE THROAT: ICD-10-CM

## 2019-03-05 DIAGNOSIS — J04.0 LARYNGITIS: ICD-10-CM

## 2019-03-05 LAB
ABSOLUTE EOS #: 0.11 K/UL (ref 0–0.44)
ABSOLUTE IMMATURE GRANULOCYTE: <0.03 K/UL (ref 0–0.3)
ABSOLUTE LYMPH #: 1.76 K/UL (ref 1.1–3.7)
ABSOLUTE MONO #: 0.55 K/UL (ref 0.1–1.2)
ANION GAP SERPL CALCULATED.3IONS-SCNC: 13 MMOL/L (ref 9–17)
BASOPHILS # BLD: 0 % (ref 0–2)
BASOPHILS ABSOLUTE: <0.03 K/UL (ref 0–0.2)
BUN BLDV-MCNC: 11 MG/DL (ref 6–20)
BUN/CREAT BLD: NORMAL (ref 9–20)
CALCIUM SERPL-MCNC: 8.7 MG/DL (ref 8.6–10.4)
CHLORIDE BLD-SCNC: 104 MMOL/L (ref 98–107)
CO2: 25 MMOL/L (ref 20–31)
CREAT SERPL-MCNC: 0.92 MG/DL (ref 0.7–1.2)
DIFFERENTIAL TYPE: ABNORMAL
EOSINOPHILS RELATIVE PERCENT: 1 % (ref 1–4)
GFR AFRICAN AMERICAN: >60 ML/MIN
GFR NON-AFRICAN AMERICAN: >60 ML/MIN
GFR SERPL CREATININE-BSD FRML MDRD: NORMAL ML/MIN/{1.73_M2}
GFR SERPL CREATININE-BSD FRML MDRD: NORMAL ML/MIN/{1.73_M2}
GLUCOSE BLD-MCNC: 82 MG/DL (ref 70–99)
HCT VFR BLD CALC: 43 % (ref 40.7–50.3)
HEMOGLOBIN: 13.8 G/DL (ref 13–17)
IMMATURE GRANULOCYTES: 0 %
LYMPHOCYTES # BLD: 23 % (ref 24–43)
MCH RBC QN AUTO: 28.1 PG (ref 25.2–33.5)
MCHC RBC AUTO-ENTMCNC: 32.1 G/DL (ref 28.4–34.8)
MCV RBC AUTO: 87.6 FL (ref 82.6–102.9)
MONOCYTES # BLD: 7 % (ref 3–12)
MONONUCLEOSIS SCREEN: NEGATIVE
NRBC AUTOMATED: 0 PER 100 WBC
PDW BLD-RTO: 12.5 % (ref 11.8–14.4)
PLATELET # BLD: 211 K/UL (ref 138–453)
PLATELET ESTIMATE: ABNORMAL
PMV BLD AUTO: 11.4 FL (ref 8.1–13.5)
POTASSIUM SERPL-SCNC: 4.4 MMOL/L (ref 3.7–5.3)
RBC # BLD: 4.91 M/UL (ref 4.21–5.77)
RBC # BLD: ABNORMAL 10*6/UL
SEG NEUTROPHILS: 69 % (ref 36–65)
SEGMENTED NEUTROPHILS ABSOLUTE COUNT: 5.21 K/UL (ref 1.5–8.1)
SODIUM BLD-SCNC: 142 MMOL/L (ref 135–144)
TSH SERPL DL<=0.05 MIU/L-ACNC: 1.14 MIU/L (ref 0.3–5)
WBC # BLD: 7.7 K/UL (ref 3.5–11.3)
WBC # BLD: ABNORMAL 10*3/UL

## 2019-03-05 PROCEDURE — 99213 OFFICE O/P EST LOW 20 MIN: CPT | Performed by: NURSE PRACTITIONER

## 2019-03-05 ASSESSMENT — ENCOUNTER SYMPTOMS
TROUBLE SWALLOWING: 0
COLOR CHANGE: 0
COUGH: 0
CHEST TIGHTNESS: 0
ABDOMINAL PAIN: 0
PHOTOPHOBIA: 0
RHINORRHEA: 0
SHORTNESS OF BREATH: 0
SINUS PRESSURE: 1
SORE THROAT: 1

## 2019-03-05 ASSESSMENT — PATIENT HEALTH QUESTIONNAIRE - PHQ9
SUM OF ALL RESPONSES TO PHQ QUESTIONS 1-9: 0
1. LITTLE INTEREST OR PLEASURE IN DOING THINGS: 0
SUM OF ALL RESPONSES TO PHQ9 QUESTIONS 1 & 2: 0
SUM OF ALL RESPONSES TO PHQ QUESTIONS 1-9: 0
2. FEELING DOWN, DEPRESSED OR HOPELESS: 0

## 2019-03-06 LAB — CMV IGM: 0.2

## 2019-03-06 RX ORDER — CEFUROXIME AXETIL 500 MG/1
500 TABLET ORAL 2 TIMES DAILY
Qty: 20 TABLET | Refills: 0 | Status: SHIPPED | OUTPATIENT
Start: 2019-03-06 | End: 2019-03-16

## 2019-04-23 RX ORDER — CYCLOBENZAPRINE HCL 10 MG
TABLET ORAL
Qty: 60 TABLET | Refills: 0 | Status: SHIPPED | OUTPATIENT
Start: 2019-04-23 | End: 2019-06-17 | Stop reason: SDUPTHER

## 2019-06-18 RX ORDER — CYCLOBENZAPRINE HCL 10 MG
TABLET ORAL
Qty: 60 TABLET | Refills: 0 | Status: SHIPPED | OUTPATIENT
Start: 2019-06-18 | End: 2019-08-05 | Stop reason: SDUPTHER

## 2019-06-27 ENCOUNTER — OFFICE VISIT (OUTPATIENT)
Dept: FAMILY MEDICINE CLINIC | Age: 44
End: 2019-06-27
Payer: COMMERCIAL

## 2019-06-27 VITALS
HEART RATE: 81 BPM | TEMPERATURE: 97.8 F | WEIGHT: 283 LBS | OXYGEN SATURATION: 99 % | HEIGHT: 74 IN | SYSTOLIC BLOOD PRESSURE: 116 MMHG | DIASTOLIC BLOOD PRESSURE: 80 MMHG | BODY MASS INDEX: 36.32 KG/M2

## 2019-06-27 DIAGNOSIS — J02.9 SORE THROAT: Primary | ICD-10-CM
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
## 2019-06-27 LAB — S PYO AG THROAT QL: NORMAL

## 2019-06-27 PROCEDURE — 99213 OFFICE O/P EST LOW 20 MIN: CPT | Performed by: NURSE PRACTITIONER

## 2019-06-27 PROCEDURE — 87880 STREP A ASSAY W/OPTIC: CPT | Performed by: NURSE PRACTITIONER

## 2019-06-27 RX ORDER — DOXYCYCLINE HYCLATE 100 MG
100 TABLET ORAL 2 TIMES DAILY
Qty: 14 TABLET | Refills: 0 | Status: SHIPPED | OUTPATIENT
Start: 2019-06-27 | End: 2019-12-27 | Stop reason: SDUPTHER

## 2019-06-27 ASSESSMENT — ENCOUNTER SYMPTOMS
VOMITING: 0
TROUBLE SWALLOWING: 0
SHORTNESS OF BREATH: 0
ABDOMINAL PAIN: 0
SWOLLEN GLANDS: 1
SINUS PRESSURE: 0
RHINORRHEA: 0
CHANGE IN BOWEL HABIT: 0
SORE THROAT: 1
COUGH: 0
CHEST TIGHTNESS: 0

## 2019-06-27 NOTE — PROGRESS NOTES
Visit Information    Have you changed or started any medications since your last visit including any over-the-counter medicines, vitamins, or herbal medicines? no   Have you stopped taking any of your medications? Is so, why? -  no  Are you having any side effects from any of your medications? - no    Have you seen any other physician or provider since your last visit?  no   Have you had any other diagnostic tests since your last visit?  no   Have you been seen in the emergency room and/or had an admission in a hospital since we last saw you?  no   Have you had your routine dental cleaning in the past 6 months?  no     Do you have an active MyChart account? If no, what is the barrier?   Yes    Patient Care Team:  TERENCE Calle CNP as PCP - General (Nurse Practitioner)  TERENCE Calle CNP as PCP [facility]    Medical History Review  Past Medical, Family, and Social History reviewed and  contribute to the patient presenting condition    Health Maintenance   Topic Date Due    Flu vaccine (Season Ended) 09/01/2019    DTaP/Tdap/Td vaccine (2 - Td) 03/01/2020    Lipid screen  04/07/2021    HIV screen  Addressed    Pneumococcal 0-64 years Vaccine  Aged Out
prescribed medications. All patient questions answered. Pt voiced understanding. Reviewed health maintenance. Instructed to continue currentmedications, diet and exercise.     Electronically signed by Sintia Castro CNP on 6/27/2019 at 3:23 PM

## 2019-08-05 RX ORDER — CYCLOBENZAPRINE HCL 10 MG
TABLET ORAL
Qty: 60 TABLET | Refills: 0 | Status: SHIPPED | OUTPATIENT
Start: 2019-08-05 | End: 2019-10-14 | Stop reason: SDUPTHER

## 2019-08-09 ENCOUNTER — OFFICE VISIT (OUTPATIENT)
Dept: FAMILY MEDICINE CLINIC | Age: 44
End: 2019-08-09
Payer: COMMERCIAL

## 2019-08-09 VITALS
WEIGHT: 285 LBS | TEMPERATURE: 95.9 F | HEIGHT: 74 IN | SYSTOLIC BLOOD PRESSURE: 134 MMHG | BODY MASS INDEX: 36.57 KG/M2 | OXYGEN SATURATION: 99 % | DIASTOLIC BLOOD PRESSURE: 80 MMHG | HEART RATE: 74 BPM

## 2019-08-09 DIAGNOSIS — Z23 NEED FOR TDAP VACCINATION: ICD-10-CM

## 2019-08-09 DIAGNOSIS — M54.81 BILATERAL OCCIPITAL NEURALGIA: Primary | ICD-10-CM

## 2019-08-09 PROCEDURE — 90715 TDAP VACCINE 7 YRS/> IM: CPT | Performed by: NURSE PRACTITIONER

## 2019-08-09 PROCEDURE — 99213 OFFICE O/P EST LOW 20 MIN: CPT | Performed by: NURSE PRACTITIONER

## 2019-08-09 PROCEDURE — 90471 IMMUNIZATION ADMIN: CPT | Performed by: NURSE PRACTITIONER

## 2019-08-09 ASSESSMENT — ENCOUNTER SYMPTOMS
SHORTNESS OF BREATH: 0
COLOR CHANGE: 1
COUGH: 0
CHEST TIGHTNESS: 0
TROUBLE SWALLOWING: 0
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
## 2019-08-13 ENCOUNTER — PATIENT MESSAGE (OUTPATIENT)
Dept: FAMILY MEDICINE CLINIC | Age: 44
End: 2019-08-13

## 2019-08-13 NOTE — TELEPHONE ENCOUNTER
From: Delfina Rhodes  To: Aime Johnson, APRN - SAJAN  Sent: 8/13/2019 9:41 AM EDT  Subject: Visit Follow-Up Question    I just chatted with Ilana. Ill head to the Er in flower. Thank you Yumiko Felix  ----- Message -----  From: Aime Johnson APRN - SAJAN  Sent: 8/13/2019 9:10 AM EDT  To: Delfina Rhodes  Subject: RE: Visit Follow-Up Question  Oh no! Sorry to hear this. .I can order labs stat today and or you can go to Er if that bad. .let me know what you want me to do  albert    ----- Message -----   From: Delfina Rhodes   Sent: 8/13/2019 5:42 AM EDT   To: Aime Johnson, TERENCE - CNP  Subject: Visit Follow-Up Question    Good morning Yumiko Washington. I had a tetanus shot on Friday. Sunday morning, the muscles in my legs started to feel super fatigued. It feels like they are super weak and all my leg strength is shot. This has persisted since then and is getting much more pronounced. Inconsistently, my right leg feels like its going to buckle. Consistently, my left leg feels like its going to buckle. It was pretty bad last night and I thought to see if sleeping would make it better. It didnt. I read up on the rx sheet for the tetanus shot and it said to call you right away if I am experiencing muscle weakness. So, um, yes. Im letting you know. What should I do?

## 2019-09-04 ENCOUNTER — PATIENT MESSAGE (OUTPATIENT)
Dept: FAMILY MEDICINE CLINIC | Age: 44
End: 2019-09-04

## 2019-09-04 DIAGNOSIS — A87.9 VIRAL MENINGITIS: Primary | ICD-10-CM

## 2019-09-05 ENCOUNTER — HOSPITAL ENCOUNTER (OUTPATIENT)
Age: 44
Discharge: HOME | End: 2019-09-05
Payer: COMMERCIAL

## 2019-09-05 DIAGNOSIS — A87.9 VIRAL MENINGITIS: ICD-10-CM

## 2019-09-05 LAB
ADRENOCORTICOTROPIC HORMONE: 25 PG/ML (ref 7–69)
CORTISOL COLLECTION INFO: NORMAL
CORTISOL: 10.8 UG/DL (ref 2.7–18.4)

## 2019-09-05 PROCEDURE — 36415 COLL VENOUS BLD VENIPUNCTURE: CPT

## 2019-09-05 PROCEDURE — 82024 ASSAY OF ACTH: CPT

## 2019-09-05 PROCEDURE — 82533 TOTAL CORTISOL: CPT

## 2019-10-14 RX ORDER — CYCLOBENZAPRINE HCL 10 MG
TABLET ORAL
Qty: 60 TABLET | Refills: 0 | Status: SHIPPED | OUTPATIENT
Start: 2019-10-14 | End: 2019-12-11 | Stop reason: SDUPTHER

## 2019-10-29 ENCOUNTER — OFFICE VISIT (OUTPATIENT)
Dept: FAMILY MEDICINE CLINIC | Age: 44
End: 2019-10-29
Payer: COMMERCIAL

## 2019-10-29 VITALS
DIASTOLIC BLOOD PRESSURE: 86 MMHG | TEMPERATURE: 97.7 F | WEIGHT: 290 LBS | OXYGEN SATURATION: 99 % | HEART RATE: 79 BPM | HEIGHT: 74 IN | SYSTOLIC BLOOD PRESSURE: 132 MMHG | BODY MASS INDEX: 37.22 KG/M2

## 2019-10-29 DIAGNOSIS — R25.1 TREMOR: ICD-10-CM

## 2019-10-29 DIAGNOSIS — Z12.5 SPECIAL SCREENING FOR MALIGNANT NEOPLASM OF PROSTATE: ICD-10-CM

## 2019-10-29 DIAGNOSIS — E89.0 POSTOPERATIVE HYPOTHYROIDISM: ICD-10-CM

## 2019-10-29 DIAGNOSIS — R25.8 BRADYKINESIA: ICD-10-CM

## 2019-10-29 DIAGNOSIS — Z13.220 SCREENING FOR LIPOID DISORDERS: ICD-10-CM

## 2019-10-29 DIAGNOSIS — M62.81 MUSCLE WEAKNESS: Primary | ICD-10-CM

## 2019-10-29 DIAGNOSIS — H53.2 DOUBLE VISION WITH BOTH EYES OPEN: ICD-10-CM

## 2019-10-29 PROCEDURE — 99214 OFFICE O/P EST MOD 30 MIN: CPT | Performed by: NURSE PRACTITIONER

## 2019-10-29 ASSESSMENT — ENCOUNTER SYMPTOMS
SHORTNESS OF BREATH: 0
VOMITING: 0
COLOR CHANGE: 0
COUGH: 0
CHEST TIGHTNESS: 0
ABDOMINAL PAIN: 0
NAUSEA: 0

## 2019-10-30 ENCOUNTER — HOSPITAL ENCOUNTER (OUTPATIENT)
Age: 44
Setting detail: SPECIMEN
Discharge: HOME | End: 2019-10-30
Payer: COMMERCIAL

## 2019-10-30 DIAGNOSIS — R25.1 TREMOR: ICD-10-CM

## 2019-10-30 DIAGNOSIS — Z12.5 SPECIAL SCREENING FOR MALIGNANT NEOPLASM OF PROSTATE: ICD-10-CM

## 2019-10-30 DIAGNOSIS — R25.8 BRADYKINESIA: ICD-10-CM

## 2019-10-30 DIAGNOSIS — Z13.220 SCREENING FOR LIPOID DISORDERS: ICD-10-CM

## 2019-10-30 DIAGNOSIS — E89.0 POSTOPERATIVE HYPOTHYROIDISM: ICD-10-CM

## 2019-10-30 DIAGNOSIS — H53.2 DOUBLE VISION WITH BOTH EYES OPEN: ICD-10-CM

## 2019-10-30 DIAGNOSIS — M62.81 MUSCLE WEAKNESS: ICD-10-CM

## 2019-10-30 LAB
CHOLESTEROL/HDL RATIO: 3.3
CHOLESTEROL: 175 MG/DL
HDLC SERPL-MCNC: 53 MG/DL
LDL CHOLESTEROL: 109 MG/DL (ref 0–130)
PROSTATE SPECIFIC ANTIGEN: 0.56 UG/L
T3 FREE: 2.88 PG/ML (ref 2.02–4.43)
THYROXINE, FREE: 1.62 NG/DL (ref 0.93–1.7)
TRIGL SERPL-MCNC: 65 MG/DL
VLDLC SERPL CALC-MCNC: NORMAL MG/DL (ref 1–30)

## 2019-10-31 LAB — ACETYLCHOL BLOCK AB: 0 % (ref 0–26)

## 2019-12-11 RX ORDER — CYCLOBENZAPRINE HCL 10 MG
TABLET ORAL
Qty: 60 TABLET | Refills: 0 | Status: SHIPPED | OUTPATIENT
Start: 2019-12-11 | End: 2020-03-17 | Stop reason: SDUPTHER

## 2019-12-27 ENCOUNTER — OFFICE VISIT (OUTPATIENT)
Dept: FAMILY MEDICINE CLINIC | Age: 44
End: 2019-12-27
Payer: COMMERCIAL

## 2019-12-27 VITALS
SYSTOLIC BLOOD PRESSURE: 138 MMHG | WEIGHT: 293 LBS | DIASTOLIC BLOOD PRESSURE: 80 MMHG | TEMPERATURE: 99.1 F | HEART RATE: 102 BPM | OXYGEN SATURATION: 98 % | BODY MASS INDEX: 37.6 KG/M2

## 2019-12-27 DIAGNOSIS — R05.9 COUGH: Primary | ICD-10-CM

## 2019-12-27 DIAGNOSIS — R50.9 FEVER AND CHILLS: ICD-10-CM

## 2019-12-27 PROBLEM — G43.109 MIGRAINE WITH AURA AND WITHOUT STATUS MIGRAINOSUS, NOT INTRACTABLE: Status: ACTIVE | Noted: 2019-08-06

## 2019-12-27 PROBLEM — R29.818 TRANSIENT NEUROLOGICAL SYMPTOMS: Status: ACTIVE | Noted: 2019-11-04

## 2019-12-27 PROBLEM — K90.0 CELIAC DISEASE: Status: ACTIVE | Noted: 2019-12-27

## 2019-12-27 PROBLEM — E06.3 HASHIMOTO'S THYROIDITIS: Status: ACTIVE | Noted: 2019-12-27

## 2019-12-27 PROBLEM — M19.012 ARTHRITIS OF LEFT ACROMIOCLAVICULAR JOINT: Status: ACTIVE | Noted: 2018-10-12

## 2019-12-27 PROBLEM — T78.2XXA ANAPHYLAXIS: Status: ACTIVE | Noted: 2019-12-27

## 2019-12-27 PROBLEM — L50.0 ALLERGIC URTICARIA: Status: ACTIVE | Noted: 2019-12-27

## 2019-12-27 PROBLEM — G25.81 RESTLESS LEGS SYNDROME: Status: ACTIVE | Noted: 2019-12-13

## 2019-12-27 PROBLEM — E21.5 DISORDER OF PARATHYROID GLAND (HCC): Status: ACTIVE | Noted: 2019-12-27

## 2019-12-27 LAB — INFLUENZA A ANTIBODY: NORMAL

## 2019-12-27 PROCEDURE — 87804 INFLUENZA ASSAY W/OPTIC: CPT | Performed by: NURSE PRACTITIONER

## 2019-12-27 PROCEDURE — 99213 OFFICE O/P EST LOW 20 MIN: CPT | Performed by: NURSE PRACTITIONER

## 2019-12-27 RX ORDER — BENZONATATE 200 MG/1
200 CAPSULE ORAL 3 TIMES DAILY PRN
Qty: 30 CAPSULE | Refills: 0 | Status: SHIPPED | OUTPATIENT
Start: 2019-12-27 | End: 2020-01-03

## 2019-12-27 RX ORDER — DOXYCYCLINE HYCLATE 100 MG
100 TABLET ORAL 2 TIMES DAILY
Qty: 14 TABLET | Refills: 0 | Status: SHIPPED | OUTPATIENT
Start: 2019-12-27 | End: 2020-01-03

## 2019-12-27 ASSESSMENT — ENCOUNTER SYMPTOMS
CHEST TIGHTNESS: 1
SORE THROAT: 0
VOMITING: 0
TROUBLE SWALLOWING: 0
NAUSEA: 0
WHEEZING: 0
SINUS PRESSURE: 0
SHORTNESS OF BREATH: 0
COUGH: 1
RHINORRHEA: 0
ABDOMINAL PAIN: 0

## 2020-01-22 ENCOUNTER — OFFICE VISIT (OUTPATIENT)
Dept: FAMILY MEDICINE CLINIC | Age: 45
End: 2020-01-22
Payer: COMMERCIAL

## 2020-01-22 ENCOUNTER — HOSPITAL ENCOUNTER (OUTPATIENT)
Age: 45
Discharge: HOME | End: 2020-01-22
Payer: COMMERCIAL

## 2020-01-22 VITALS
DIASTOLIC BLOOD PRESSURE: 78 MMHG | WEIGHT: 299 LBS | TEMPERATURE: 97.7 F | HEIGHT: 74 IN | OXYGEN SATURATION: 98 % | SYSTOLIC BLOOD PRESSURE: 148 MMHG | BODY MASS INDEX: 38.37 KG/M2 | HEART RATE: 77 BPM

## 2020-01-22 PROBLEM — R25.3 BENIGN FASCICULATION-CRAMP SYNDROME: Status: ACTIVE | Noted: 2020-01-22

## 2020-01-22 PROBLEM — G47.33 OSA (OBSTRUCTIVE SLEEP APNEA): Status: ACTIVE | Noted: 2020-01-09

## 2020-01-22 LAB
VITAMIN B-12: 680 PG/ML (ref 232–1245)
VITAMIN D 25-HYDROXY: 23 NG/ML (ref 30–100)

## 2020-01-22 PROCEDURE — 82306 VITAMIN D 25 HYDROXY: CPT

## 2020-01-22 PROCEDURE — 82607 VITAMIN B-12: CPT

## 2020-01-22 PROCEDURE — 99214 OFFICE O/P EST MOD 30 MIN: CPT | Performed by: NURSE PRACTITIONER

## 2020-01-22 PROCEDURE — 86003 ALLG SPEC IGE CRUDE XTRC EA: CPT

## 2020-01-22 PROCEDURE — 36415 COLL VENOUS BLD VENIPUNCTURE: CPT

## 2020-01-22 PROCEDURE — 83519 RIA NONANTIBODY: CPT

## 2020-01-22 PROCEDURE — 86617 LYME DISEASE ANTIBODY: CPT

## 2020-01-22 RX ORDER — TIZANIDINE 2 MG/1
2 TABLET ORAL EVERY 6 HOURS PRN
COMMUNITY
End: 2020-10-23

## 2020-01-22 RX ORDER — CALCIUM CARBONATE 300MG(750)
400 TABLET,CHEWABLE ORAL 3 TIMES DAILY
COMMUNITY
End: 2020-10-23

## 2020-01-22 ASSESSMENT — ENCOUNTER SYMPTOMS
SHORTNESS OF BREATH: 0
VOMITING: 0
CHEST TIGHTNESS: 0
APNEA: 1
COUGH: 0
ABDOMINAL PAIN: 0
NAUSEA: 0

## 2020-01-22 ASSESSMENT — PATIENT HEALTH QUESTIONNAIRE - PHQ9
SUM OF ALL RESPONSES TO PHQ9 QUESTIONS 1 & 2: 0
SUM OF ALL RESPONSES TO PHQ QUESTIONS 1-9: 0
1. LITTLE INTEREST OR PLEASURE IN DOING THINGS: 0
SUM OF ALL RESPONSES TO PHQ QUESTIONS 1-9: 0
2. FEELING DOWN, DEPRESSED OR HOPELESS: 0

## 2020-01-22 NOTE — PROGRESS NOTES
Visit Information    Have you changed or started any medications since your last visit including any over-the-counter medicines, vitamins, or herbal medicines? no   Have you stopped taking any of your medications? Is so, why? -  no  Are you having any side effects from any of your medications? - no    Have you seen any other physician or provider since your last visit?  no   Have you had any other diagnostic tests since your last visit?  no   Have you been seen in the emergency room and/or had an admission in a hospital since we last saw you?  no   Have you had your routine dental cleaning in the past 6 months?  no     Do you have an active MyChart account? If no, what is the barrier?   Yes    Patient Care Team:  TERENCE Dan CNP as PCP - General (Nurse Practitioner)  TERENCE [facility]    Medical History Review  Past Medical, Family, and Social History reviewed and  contribute to the patient presenting condition    Health Maintenance   Topic Date Due    Flu vaccine (1) 10/29/2020 (Originally 9/1/2019)    TSH testing  03/05/2020    Lipid screen  10/30/2024    DTaP/Tdap/Td vaccine (3 - Td) 08/09/2029    HIV screen  Addressed    Pneumococcal 0-64 years Vaccine  Aged Out

## 2020-01-24 LAB
ALLERGEN HONEY BEE IGE: 2.52 KU/L (ref 0–0.34)
ALLERGEN WHITE-FACED HORNET IGE: <0.34 KU/L (ref 0–0.34)
COMMON WASP, YELLOW JACKET IGE: <0.34 KU/L (ref 0–0.34)
IGE: 9 IU/ML
LYME IGM WB: NEGATIVE
LYME WESTERN BLOT IGG: NEGATIVE
PAPER WASP IGE CLASS: <0.34 KU/L (ref 0–0.34)
YELLOW HORNET IGE: <0.34 KU/L (ref 0–0.34)

## 2020-01-29 ENCOUNTER — PATIENT MESSAGE (OUTPATIENT)
Dept: FAMILY MEDICINE CLINIC | Age: 45
End: 2020-01-29

## 2020-01-29 LAB
SEND OUT REPORT: NORMAL
TEST NAME: NORMAL

## 2020-03-13 ENCOUNTER — PATIENT MESSAGE (OUTPATIENT)
Dept: FAMILY MEDICINE CLINIC | Age: 45
End: 2020-03-13

## 2020-03-13 RX ORDER — LEVOTHYROXINE SODIUM 0.15 MG/1
150 TABLET ORAL DAILY
Qty: 90 TABLET | Refills: 2 | Status: SHIPPED | OUTPATIENT
Start: 2020-03-13 | End: 2021-06-29 | Stop reason: ALTCHOICE

## 2020-03-13 NOTE — TELEPHONE ENCOUNTER
From: Adam Aldrich  To: Cindy Graves, APRN - CNP  Sent: 3/13/2020 1:45 PM EDT  Subject: Prescription Question    Manju Selby, my Synthroid needs to be refilled. I am about out and it was last filled by my old endocrinologist. Im not seeing her anymore. Can you reorder it? 90 day supply Synthroid 150mcg taken once a day?      Thank you   Leonardo   [phone]

## 2020-03-17 ENCOUNTER — PATIENT MESSAGE (OUTPATIENT)
Dept: FAMILY MEDICINE CLINIC | Age: 45
End: 2020-03-17

## 2020-03-17 RX ORDER — ACETAMINOPHEN AND CODEINE PHOSPHATE 300; 30 MG/1; MG/1
1-2 TABLET ORAL 2 TIMES DAILY PRN
Qty: 14 TABLET | Refills: 0 | Status: SHIPPED | OUTPATIENT
Start: 2020-03-17 | End: 2020-03-24

## 2020-03-17 RX ORDER — CYCLOBENZAPRINE HCL 10 MG
TABLET ORAL
Qty: 60 TABLET | Refills: 0 | Status: SHIPPED | OUTPATIENT
Start: 2020-03-17 | End: 2020-07-16

## 2020-03-17 RX ORDER — IBUPROFEN 600 MG/1
600 TABLET ORAL 4 TIMES DAILY PRN
Qty: 40 TABLET | Refills: 0 | Status: SHIPPED | OUTPATIENT
Start: 2020-03-17

## 2020-04-16 ENCOUNTER — NURSE TRIAGE (OUTPATIENT)
Dept: OTHER | Facility: CLINIC | Age: 45
End: 2020-04-16

## 2020-04-30 ENCOUNTER — TELEPHONE (OUTPATIENT)
Dept: FAMILY MEDICINE CLINIC | Age: 45
End: 2020-04-30

## 2020-05-04 ENCOUNTER — TELEPHONE (OUTPATIENT)
Dept: FAMILY MEDICINE CLINIC | Age: 45
End: 2020-05-04

## 2020-05-04 NOTE — TELEPHONE ENCOUNTER
Nurse called stating that he is getting depopen infusion. He has been taking it For over 1 week. Patient started developing a rash and he didn't take his 10 pm dose.

## 2020-06-12 ENCOUNTER — TELEMEDICINE (OUTPATIENT)
Dept: FAMILY MEDICINE CLINIC | Age: 45
End: 2020-06-12
Payer: COMMERCIAL

## 2020-06-12 PROBLEM — K90.0 CELIAC DISEASE: Status: RESOLVED | Noted: 2019-12-27 | Resolved: 2020-06-12

## 2020-06-12 PROBLEM — G25.81 RESTLESS LEGS SYNDROME: Status: RESOLVED | Noted: 2019-12-13 | Resolved: 2020-06-12

## 2020-06-12 PROBLEM — K86.81 EXOCRINE PANCREATIC INSUFFICIENCY: Status: ACTIVE | Noted: 2020-06-12

## 2020-06-12 PROBLEM — E23.2 DIABETES INSIPIDUS (HCC): Status: ACTIVE | Noted: 2020-06-12

## 2020-06-12 PROCEDURE — 99214 OFFICE O/P EST MOD 30 MIN: CPT | Performed by: NURSE PRACTITIONER

## 2020-06-12 ASSESSMENT — ENCOUNTER SYMPTOMS
CHEST TIGHTNESS: 0
NAUSEA: 0
VOMITING: 0
ABDOMINAL DISTENTION: 1
ABDOMINAL PAIN: 1
COUGH: 0
SHORTNESS OF BREATH: 0
DIARRHEA: 1

## 2020-06-12 NOTE — PROGRESS NOTES
discoloration noted on facial skin         [] Abnormal-            Psychiatric:       [x] Normal Affect [x] No Hallucinations        [] Abnormal-     Other pertinent observable physical exam findings-     ASSESSMENT/PLAN:  1. Exocrine pancreatic insufficiency  New dx recently  Continue creon and with gi/surgeon  Limit fat within diet and continue with dietician  - Vitamin B12; Future    2. Idiopathic acute pancreatitis with infected necrosis  CT abdomen and pelvis with contrast6/3/2020  [email or web address]  Result Narrative   CT ABDOMEN AND PELVIS WITH IV CONTRAST    TECHNIQUE:   Helically acquired axial images of the abdomen and pelvis from the diaphragm to the iliac crest and the iliac crest to the symphysis pubis obtained with sagittal and coronal multiplanar reconstructions. CT scan abdomen and pelvis was performed with 1 25 mL Omnipaque 300 intravenous and oral contrast, without immediate complication. Dose reduction: Dose reduction techniques were achieved by using automated exposure control and/or adjustment of the mA and/or kv according to patient size and/or use of iterative reconstruction technique. HISTORY:   Pancreatitis with pseudocyst/walled off necrosis follow-up.  Idiopathic acute pancreatitis with an infected necrosis. COMPARISON: CT abdomen pelvis 5/6/2020. FINDINGS:    LUNG BASES/LOWER CHEST: Lung bases are clear. No pericardial effusion. LIVER: Liver demonstrates normal morphology and attenuation without focal lesion.      SPLEEN: Unremarkable. BILIARY TREE/GALLBLADDER/PANCREAS: No biliary ductal dilatation.  Gallbladder not visualized. Previously seen fluid collection in the pancreatic region has almost completely resolved. Conor Cure is small heterogeneous collection seen along the mid anterior pancreatic body on axial image #25 and coronal   image #52 measuring 3.4 x 2.8 cm which is somewhat heterogeneous in appearance. .  The pancreas has homogeneous low attenuation and TeleHealth encounter (During HURTW-76 public health emergency), evaluation of the following organ systems was limited: Vitals/Constitutional/EENT/Resp/CV/GI//MS/Neuro/Skin/Heme-Lymph-Imm. Pursuant to the emergency declaration under the [facility], [address] [place] [address] and the [employer] and [employer] Act, this Virtual Visit was conducted with patient's (and/or legal guardian's) consent, to reduce the patient's risk of exposure to COVID-19 and provide necessary medical care. The patient (and/or legal guardian) has also been advised to contact this office for worsening conditions or problems, and seek emergency medical treatment and/or call 911 if deemed necessary. Patient identification was verified at the start of the visit: Yes    Total time spent on this encounter: Not billed by time    Services were provided through a video synchronous discussion virtually to substitute for in-person clinic visit. Patient and provider were located at their individual homes. --TERENCE Bruce CNP on 6/12/2020 at 12:48 PM    An electronic signature was used to authenticate this note.

## 2020-06-12 NOTE — PROGRESS NOTES
Visit Information    Have you changed or started any medications since your last visit including any over-the-counter medicines, vitamins, or herbal medicines? no   Have you stopped taking any of your medications? Is so, why? -  no  Are you having any side effects from any of your medications? - no    Have you seen any other physician or provider since your last visit?  no   Have you had any other diagnostic tests since your last visit?  no   Have you been seen in the emergency room and/or had an admission in a hospital since we last saw you?  no   Have you had your routine dental cleaning in the past 6 months?  no     Do you have an active MyChart account? If no, what is the barrier?   Yes    Patient Care Team:  TERENCE Garcia CNP as PCP - General (Nurse Practitioner)  TERENCE Garcia CNP [facility] PCP [facility]    Medical History Review  Past Medical, Family, and Social History reviewed and  contribute to the patient presenting condition    Health Maintenance   Topic Date Due    TSH testing  03/05/2020    Flu vaccine (Season Ended) 10/29/2020 (Originally 9/1/2020)    Lipid screen  10/30/2024    DTaP/Tdap/Td vaccine (3 - Td) 08/09/2029    HIV screen  Addressed    Hepatitis A vaccine  Aged Out    Hepatitis B vaccine  Aged Out    Hib vaccine  Aged Out    Meningococcal (ACWY) vaccine  Aged Out    Pneumococcal 0-64 years Vaccine  Aged Out

## 2020-06-16 ENCOUNTER — HOSPITAL ENCOUNTER (OUTPATIENT)
Age: 45
Discharge: HOME | End: 2020-06-16
Payer: COMMERCIAL

## 2020-06-16 LAB
ESTIMATED AVERAGE GLUCOSE: 103 MG/DL
HBA1C MFR BLD: 5.2 % (ref 4–6)
T3 FREE: 2.63 PG/ML (ref 2.02–4.43)
THYROXINE, FREE: 1.26 NG/DL (ref 0.93–1.7)
TSH SERPL DL<=0.05 MIU/L-ACNC: 5.25 MIU/L (ref 0.3–5)
VITAMIN B-12: 477 PG/ML (ref 232–1245)
VITAMIN D 25-HYDROXY: 20.1 NG/ML (ref 30–100)

## 2020-06-16 PROCEDURE — 82306 VITAMIN D 25 HYDROXY: CPT

## 2020-06-16 PROCEDURE — 36415 COLL VENOUS BLD VENIPUNCTURE: CPT

## 2020-06-16 PROCEDURE — 84481 FREE ASSAY (FT-3): CPT

## 2020-06-16 PROCEDURE — 83036 HEMOGLOBIN GLYCOSYLATED A1C: CPT

## 2020-06-16 PROCEDURE — 82607 VITAMIN B-12: CPT

## 2020-06-16 PROCEDURE — 84443 ASSAY THYROID STIM HORMONE: CPT

## 2020-06-16 PROCEDURE — 84439 ASSAY OF FREE THYROXINE: CPT

## 2020-06-18 ENCOUNTER — PATIENT MESSAGE (OUTPATIENT)
Dept: FAMILY MEDICINE CLINIC | Age: 45
End: 2020-06-18

## 2020-06-22 ENCOUNTER — PATIENT MESSAGE (OUTPATIENT)
Dept: FAMILY MEDICINE CLINIC | Age: 45
End: 2020-06-22

## 2020-07-16 ENCOUNTER — HOSPITAL ENCOUNTER (OUTPATIENT)
Age: 45
Discharge: HOME | End: 2020-07-16
Payer: COMMERCIAL

## 2020-07-16 PROCEDURE — 84597 ASSAY OF VITAMIN K: CPT

## 2020-07-16 PROCEDURE — 36415 COLL VENOUS BLD VENIPUNCTURE: CPT

## 2020-07-16 RX ORDER — CYCLOBENZAPRINE HCL 10 MG
TABLET ORAL
Qty: 60 TABLET | Refills: 0 | Status: SHIPPED | OUTPATIENT
Start: 2020-07-16 | End: 2020-10-27

## 2020-07-19 LAB — VITAMIN K: 1.47 NMOL/L (ref 0.22–4.88)

## 2020-07-20 ENCOUNTER — PATIENT MESSAGE (OUTPATIENT)
Dept: FAMILY MEDICINE CLINIC | Age: 45
End: 2020-07-20

## 2020-07-20 NOTE — TELEPHONE ENCOUNTER
From: Antoine Gaxiola  To: Triny Wilson, APRN - CNP  Sent: 7/20/2020 10:29 AM EDT  Subject: Test Results Question    Good morning Benito Bains. I pray all is well. I am just checking in. Last weeks, in my follow up with Dr Tommy Lynch, she began talking about doing the Whipple procedure this fall if my bile duct doesn't open up. I have an appt with a [facility] pancreas specialist tomorrow for a second opinion. FYI. I'll let you know how it goes. Also, I am curious if the vitamin K test came back. Thank you and have a good day.      [facility]  [phone]

## 2020-10-23 ENCOUNTER — TELEMEDICINE (OUTPATIENT)
Dept: FAMILY MEDICINE CLINIC | Age: 45
End: 2020-10-23
Payer: COMMERCIAL

## 2020-10-23 PROBLEM — K86.1 CHRONIC BILIARY PANCREATITIS (HCC): Status: ACTIVE | Noted: 2020-07-14

## 2020-10-23 PROBLEM — E21.5 DISORDER OF PARATHYROID GLAND (HCC): Status: RESOLVED | Noted: 2019-12-27 | Resolved: 2020-10-23

## 2020-10-23 PROBLEM — E23.2 DIABETES INSIPIDUS (HCC): Status: RESOLVED | Noted: 2020-06-12 | Resolved: 2020-10-23

## 2020-10-23 PROCEDURE — 99214 OFFICE O/P EST MOD 30 MIN: CPT | Performed by: NURSE PRACTITIONER

## 2020-10-23 RX ORDER — ERGOCALCIFEROL 1.25 MG/1
CAPSULE ORAL
COMMUNITY
Start: 2020-10-15 | End: 2021-01-21 | Stop reason: SDUPTHER

## 2020-10-23 RX ORDER — LUBIPROSTONE 8 UG/1
8 CAPSULE, GELATIN COATED ORAL DAILY
Qty: 30 CAPSULE | Refills: 3 | Status: SHIPPED | OUTPATIENT
Start: 2020-10-23 | End: 2021-06-29 | Stop reason: ALTCHOICE

## 2020-10-23 RX ORDER — ONDANSETRON 4 MG/1
4 TABLET, ORALLY DISINTEGRATING ORAL EVERY 8 HOURS PRN
COMMUNITY
Start: 2020-08-25 | End: 2022-02-18 | Stop reason: SDUPTHER

## 2020-10-23 NOTE — PROGRESS NOTES
Visit Information    Have you changed or started any medications since your last visit including any over-the-counter medicines, vitamins, or herbal medicines? no   Are you having any side effects from any of your medications? -  no  Have you stopped taking any of your medications? Is so, why? -  no    Have you seen any other physician or provider since your last visit? No  Have you had any other diagnostic tests since your last visit? No  Have you been seen in the emergency room and/or had an admission to a hospital since we last saw you? No  Have you had your routine dental cleaning in the past 6 months? no    Have you activated your [employer] account? If not, what are your barriers?  Yes     Patient Care Team:  TERENCE Daigle CNP as PCP - General (Nurse Practitioner)  TERENCE Daigle CNP as PCP - [facility] Provider    Medical History Review  Past Medical, Family, and Social History reviewed and does contribute to the patient presenting condition    Health Maintenance   Topic Date Due    Flu vaccine (1) 09/01/2020    TSH testing  06/16/2021    Lipid screen  10/30/2024    DTaP/Tdap/Td vaccine (3 - Td) 08/09/2029    HIV screen  Addressed    Hepatitis A vaccine  Aged Out    Hepatitis B vaccine  Aged Out    Hib vaccine  Aged Out    Meningococcal (ACWY) vaccine  Aged Out    Pneumococcal 0-64 years Vaccine  Aged Out

## 2020-10-23 NOTE — PROGRESS NOTES
10/23/2020    TELEHEALTH EVALUATION -- Audio/Visual (During LAFML-61 public health emergency)    HPI:    Swati Arriaza (:  1975) has requested an audio/video evaluation for the following concern(s):    Pt. Calling in today for health update. He had necrotizing pancreatitis ealrier this year and had ongoing GI problems ever since. He had tube feed put in in early september, but did worse with this in and it was removed end of the September. He has been told to eat 2300 calories per day but is not able to do this. He is at about 1400 per day now. He get abd. Pain and constipation easily, venus. Since he has to take Creon with every meal. He has tried to increase fiber within his diet and also yemi seeds with no improvement with constipation. He bowels did not stop working for 10 days when he had initial pancreatitis. He has appt with dietician at [facility] soon and continues with GI there also. He would like new labs for thyroid as he feels his metabolism has slowed down a lot and it was abnormal in , but he did not want to make any changes to dose then. Review of Systems    Prior to Visit Medications    Medication Sig Taking?  Authorizing Provider   vitamin D (ERGOCALCIFEROL) 1.25 MG (10513 UT) CAPS capsule  Yes Historical Provider, MD   ondansetron (ZOFRAN-ODT) 4 MG disintegrating tablet Take 4 mg by mouth every 8 hours as needed Yes Historical Provider, MD   lubiprostone (AMITIZA) 8 MCG CAPS capsule Take 1 capsule by mouth daily Yes TERENCE Miranda CNP   cyclobenzaprine (FLEXERIL) 10 MG tablet TAKE ONE TABLET BY MOUTH THREE TIMES A DAY AS NEEDED FOR MUSCLE SPASMS Yes Roxanne Larson PA-C   lipase-protease-amylase (CREON) 28145-19886 units delayed release capsule Take by mouth 3 times daily (with meals) 3 caps TID, 53605 with each meal Yes Historical Provider, MD   ibuprofen (ADVIL;MOTRIN) 600 MG tablet Take 1 tablet by mouth 4 times daily as needed for Pain Yes TERENCE Miranda CNP levothyroxine (SYNTHROID) 150 MCG tablet Take 1 tablet by mouth Daily Yes Sujatha Bird, APRN - CNP       Social History     Tobacco Use    Smoking status: Former Smoker    Smokeless tobacco: Never Used   Substance Use Topics    Alcohol use: Yes    Drug use: No          PHYSICAL EXAMINATION:  [ INSTRUCTIONS:  \"[x]\" Indicates a positive item  \"[]\" Indicates a negative item  -- DELETE ALL ITEMS NOT EXAMINED]  Vital Signs: (As obtained by patient/caregiver or practitioner observation)       Constitutional: [x] Appears well-developed and well-nourished [x] No apparent distress      [] Abnormal-   Mental status  [x] Alert and awake  [x] Oriented to person/place/time [x]Able to follow commands      Eyes:  EOM    []  Normal  [] Abnormal-  Sclera  []  Normal  [] Abnormal -         Discharge [x]  None visible  [] Abnormal -    HENT:   [x] Normocephalic, atraumatic. [] Abnormal   [x] Mouth/Throat: Mucous membranes are moist.     External Ears [x] Normal  [] Abnormal-     Neck: [x] No visualized mass     Pulmonary/Chest: [x] Respiratory effort normal.  [x] No visualized signs of difficulty breathing or respiratory distress        [] Abnormal-      Musculoskeletal:   [x] Normal gait with no signs of ataxia         [x] Normal range of motion of neck        [] Abnormal-       Neurological:        [x] No Facial Asymmetry (Cranial nerve 7 motor function) (limited exam to video visit)          [] No gaze palsy        [] Abnormal-         Skin:        [x] No significant exanthematous lesions or discoloration noted on facial skin         [] Abnormal-            Psychiatric:       [x] Normal Affect [x] No Hallucinations        [] Abnormal-     Other pertinent observable physical exam findings-     ASSESSMENT/PLAN:  1. Vitamin D insufficiency  Last reading was 20 in June 2020  He has been getting 50,000 units per GI  Recheck levels and continue to increase within diet as able    - Vitamin D 25 Hydroxy; Future    2.  Hashimoto's thyroiditis  Lab Results   Component Value Date    TSH 5.25 (H) 06/16/2020     Recheck labs at pt. Request  Will call with test results;l  Continue same dose for now  - TSH without Reflex; Future  - T4, Free; Future  - T3, Free; Future    3. Postsurgical hypothyroidism  See above  - TSH without Reflex; Future  - T4, Free; Future  - T3, Free; Future    4. Necrotizing pancreatitis  Continue with dietician and GI as planned      5. Constipation, unspecified constipation type  Will trial low dose med for this problem  Send message with update within next week with how dose if working, will increase if needed  Continue high fiber diet  - lubiprostone (AMITIZA) 8 MCG CAPS capsule; Take 1 capsule by mouth daily  Dispense: 30 capsule; Refill: 3      Return if symptoms worsen or fail to improve. Buford Cabot is a 54 y.o. male being evaluated by a Virtual Visit (video visit) encounter to address concerns as mentioned above. A caregiver was present when appropriate. Due to this being a TeleHealth encounter (During JNKKD-26 public health emergency), evaluation of the following organ systems was limited: Vitals/Constitutional/EENT/Resp/CV/GI//MS/Neuro/Skin/Heme-Lymph-Imm. Pursuant to the emergency declaration under the [address] [place] [address] authority and the [employer] and [employer] Act, this Virtual Visit was conducted with patient's (and/or legal guardian's) consent, to reduce the patient's risk of exposure to COVID-19 and provide necessary medical care. The patient (and/or legal guardian) has also been advised to contact this office for worsening conditions or problems, and seek emergency medical treatment and/or call 911 if deemed necessary.      Patient identification was verified at the start of the visit: Yes    Total time spent on this encounter: Not billed by time    Services were provided through a video synchronous discussion virtually to substitute for in-person clinic visit. Patient and provider were located at their individual homes. --TERENCE Kamara CNP on 10/23/2020 at 11:56 AM    An electronic signature was used to authenticate this note.

## 2020-10-26 ENCOUNTER — HOSPITAL ENCOUNTER (OUTPATIENT)
Age: 45
Discharge: HOME | End: 2020-10-26
Payer: COMMERCIAL

## 2020-10-26 LAB
T3 FREE: 2.05 PG/ML (ref 2.02–4.43)
THYROXINE, FREE: 1.33 NG/DL (ref 0.93–1.7)
TSH SERPL DL<=0.05 MIU/L-ACNC: 3.44 MIU/L (ref 0.3–5)
VITAMIN D 25-HYDROXY: 29.8 NG/ML (ref 30–100)

## 2020-10-26 PROCEDURE — 84439 ASSAY OF FREE THYROXINE: CPT

## 2020-10-26 PROCEDURE — 36415 COLL VENOUS BLD VENIPUNCTURE: CPT

## 2020-10-26 PROCEDURE — 84481 FREE ASSAY (FT-3): CPT

## 2020-10-26 PROCEDURE — 82306 VITAMIN D 25 HYDROXY: CPT

## 2020-10-26 PROCEDURE — 84443 ASSAY THYROID STIM HORMONE: CPT

## 2020-11-18 ENCOUNTER — OFFICE VISIT (OUTPATIENT)
Dept: FAMILY MEDICINE CLINIC | Age: 45
End: 2020-11-18
Payer: COMMERCIAL

## 2020-11-18 VITALS
TEMPERATURE: 98.4 F | HEIGHT: 74 IN | HEART RATE: 75 BPM | SYSTOLIC BLOOD PRESSURE: 128 MMHG | WEIGHT: 239.2 LBS | OXYGEN SATURATION: 95 % | BODY MASS INDEX: 30.7 KG/M2 | DIASTOLIC BLOOD PRESSURE: 82 MMHG

## 2020-11-18 PROCEDURE — 99213 OFFICE O/P EST LOW 20 MIN: CPT | Performed by: NURSE PRACTITIONER

## 2020-11-18 RX ORDER — HYDROCODONE BITARTRATE AND ACETAMINOPHEN 5; 325 MG/1; MG/1
1 TABLET ORAL EVERY 6 HOURS PRN
COMMUNITY
Start: 2020-10-21 | End: 2021-01-21

## 2020-11-18 ASSESSMENT — ENCOUNTER SYMPTOMS
COUGH: 0
NAUSEA: 1
VOMITING: 0
ABDOMINAL DISTENTION: 1
DIARRHEA: 0
CHEST TIGHTNESS: 0
ABDOMINAL PAIN: 1
CONSTIPATION: 1
SHORTNESS OF BREATH: 0

## 2020-11-18 NOTE — PROGRESS NOTES
[address] [facility], [facility], [address]  [phone]      Magdy Winslow is a 54 y.o. male who presents today for his  medicalconditions/complaints as noted below. Magdy Winslow is c/o of Pancreatitis  . HPI:    HPI  Pt. Here today for discussion about pain mgmt options for newly diagnosed chronic pancreatitis. He had severe necrotizing pancreatitis  This past spring and has lost 70 lbs since this started. He is constantly nauseated and has a lot of eating issues. He continues with GI at [facility] and also a dietician. And his weight is starting to stabilize and only loosing about 5 lb. Per month now. He will have severe pain episodes along with significant bloating every several days. He had to go to ER on 11-6 for this. He has tried gabapentin in the past with negative side effects. He was given small rx of norco for as needed pain relief, but know that he has the 'chronic label\" he is afraid to want to take opioids the rest of his life for this. He wonders about medical THC or CBD as another tx option.     Past Medical History:   Diagnosis Date    Anxiety     Back pain     Cellulitis     Central spinal stenosis     Cough     Diarrhea     Esophageal reflux     Facial trauma     age 14, diving injury    Fatigue     Headache(784.0)     Hypertension     Hypogonadism     Hypothyroidism     hx of hashimoto's    Knee pain     Palpitations       Past Surgical History:   Procedure Laterality Date    BACK SURGERY      CHOLECYSTECTOMY      KNEE SURGERY      NOSE SURGERY      SHOULDER SURGERY      THYROIDECTOMY      TONSILLECTOMY       Family History   Problem Relation Age of Onset    Heart Disease Mother     Other Father         thyroid dysfunction     Social History     Tobacco Use    Smoking status: Former Smoker    Smokeless tobacco: Never Used   Substance Use Topics    Alcohol use: Yes      Current Outpatient Medications   Medication Sig Dispense Refill    HYDROcodone-acetaminophen (NORCO) 5-325 MG per tablet Take 1 tablet by mouth every 6 hours as needed.  cyclobenzaprine (FLEXERIL) 10 MG tablet TAKE ONE TABLET BY MOUTH THREE TIMES A DAY AS NEEDED FOR MUSCLE SPASMS 60 tablet 3    vitamin D (ERGOCALCIFEROL) 1.25 MG (75183 UT) CAPS capsule       ondansetron (ZOFRAN-ODT) 4 MG disintegrating tablet Take 4 mg by mouth every 8 hours as needed      lubiprostone (AMITIZA) 8 MCG CAPS capsule Take 1 capsule by mouth daily 30 capsule 3    lipase-protease-amylase (CREON) 40868-49442 units delayed release capsule Take by mouth 3 times daily (with meals) 3 caps TID, 87064 with each meal      ibuprofen (ADVIL;MOTRIN) 600 MG tablet Take 1 tablet by mouth 4 times daily as needed for Pain 40 tablet 0    levothyroxine (SYNTHROID) 150 MCG tablet Take 1 tablet by mouth Daily 90 tablet 2     Current Facility-Administered Medications   Medication Dose Route Frequency Provider Last Rate Last Dose    albuterol (PROVENTIL) nebulizer solution 2.5 mg  2.5 mg Nebulization Q6H PRN TERENCE Seymour - CNP         Allergies   Allergen Reactions    Levofloxacin Anaphylaxis, Hives, Other (See Comments), Rash and Shortness Of Breath    Amoxicillin Hives and Other (See Comments)     Mouth ulcers    Augmentin [Amoxicillin-Pot Clavulanate] Hives    Cefepime     Gatifloxacin      Hives    Other Hives and Other (See Comments)     tequin       Health Maintenance   Topic Date Due    Flu vaccine (1) 11/18/2021 (Originally 9/1/2020)    TSH testing  10/26/2021    Lipid screen  10/30/2024    DTaP/Tdap/Td vaccine (3 - Td) 08/09/2029    HIV screen  Addressed    Hepatitis A vaccine  Aged Out    Hepatitis B vaccine  Aged Out    Hib vaccine  Aged Out    Meningococcal (ACWY) vaccine  Aged Out    Pneumococcal 0-64 years Vaccine  Aged Out       Subjective:      Review of Systems   Constitutional: Positive for appetite change and unexpected weight change.  Negative for chills, diaphoresis, fatigue and fever. Eyes: Negative for visual disturbance. Respiratory: Negative for cough, chest tightness and shortness of breath. Cardiovascular: Negative for chest pain, palpitations and leg swelling. Gastrointestinal: Positive for abdominal distention (at times), abdominal pain, constipation (when not taking amitiza) and nausea. Negative for diarrhea and vomiting. Genitourinary: Negative for decreased urine volume and difficulty urinating. Skin: Negative for rash. Neurological: Negative for dizziness, weakness, numbness and headaches. Hematological: Negative for adenopathy. Psychiatric/Behavioral: Positive for sleep disturbance. Negative for dysphoric mood. The patient is not nervous/anxious. Objective:      Physical Exam  Vitals signs and nursing note reviewed. Constitutional:       General: He is not in acute distress. Appearance: Normal appearance. He is well-developed. He is not ill-appearing or diaphoretic. HENT:      Head: Normocephalic and atraumatic. Eyes:      Conjunctiva/sclera: Conjunctivae normal.   Neck:      Musculoskeletal: Neck supple. Cardiovascular:      Comments: No LE edema  Pulmonary:      Effort: Pulmonary effort is normal.   Skin:     General: Skin is warm and dry. Coloration: Skin is not pale. Findings: No erythema. Neurological:      General: No focal deficit present. Mental Status: He is alert and oriented to person, place, and time. Mental status is at baseline. Psychiatric:         Mood and Affect: Mood normal.         Behavior: Behavior normal.         Thought Content: Thought content normal.         Judgment: Judgment normal.         Assessment:       Diagnosis Orders   1. Chronic pancreatitis, unspecified pancreatitis type ([address]     2. Pain of upper abdomen     3.  Nausea       Wt Readings from Last 3 Encounters:   11/18/20 239 lb 3.2 oz (108.5 kg)   01/22/20 299 lb (135.6 kg)   12/27/19 293 lb (132.9 kg)     BP Readings from Last 3 Encounters:   11/18/20 128/82   01/22/20 (!) 148/78   12/27/19 138/80     Lab Results   Component Value Date    TSH 3.44 10/26/2020       Chemistry        Component Value Date/Time     03/05/2019 1225    K 4.4 03/05/2019 1225     03/05/2019 1225    CO2 25 03/05/2019 1225    BUN 11 03/05/2019 1225    CREATININE 0.92 03/05/2019 1225        Component Value Date/Time    CALCIUM 8.7 03/05/2019 1225          Plan:      Return if symptoms worsen or fail to improve. Continue with [facility] and dietician  Continue same meds for now  He requested to trial Marinol, will decide with collaborating MD if this is an option for him, and also advised to ask for permission from GI about use of THC products    Patient given educational materials - see patient instructions. Discussed use,benefit, and side effects of prescribed medications. All patient questions answered. Pt voiced understanding. Reviewed health maintenance. Instructed to continue currentmedications, diet and exercise.     Electronically signed by Lenka Castro CNP on 11/18/2020 at 10:54 AM

## 2020-11-18 NOTE — PROGRESS NOTES
Visit Information    Have you changed or started any medications since your last visit including any over-the-counter medicines, vitamins, or herbal medicines? no   Have you stopped taking any of your medications? Is so, why? -  no  Are you having any side effects from any of your medications? - no    Have you seen any other physician or provider since your last visit?  no   Have you had any other diagnostic tests since your last visit?  no   Have you been seen in the emergency room and/or had an admission in a hospital since we last saw you?  no   Have you had your routine dental cleaning in the past 6 months?  no     Do you have an active MyChart account? If no, what is the barrier?   Yes    Patient Care Team:  TERENCE Benz CNP as PCP - General (Nurse Practitioner)  TER[facility]    Medical History Review  Past Medical, Family, and Social History reviewed and  contribute to the patient presenting condition    Health Maintenance   Topic Date Due    Flu vaccine (1) 09/01/2020    TSH testing  10/26/2021    Lipid screen  10/30/2024    DTaP/Tdap/Td vaccine (3 - Td) 08/09/2029    HIV screen  Addressed    Hepatitis A vaccine  Aged Out    Hepatitis B vaccine  Aged Out    Hib vaccine  Aged Out    Meningococcal (ACWY) vaccine  Aged Out    Pneumococcal 0-64 years Vaccine  Aged Out

## 2020-11-19 ENCOUNTER — PATIENT MESSAGE (OUTPATIENT)
Dept: FAMILY MEDICINE CLINIC | Age: 45
End: 2020-11-19

## 2020-11-19 NOTE — TELEPHONE ENCOUNTER
From: Alyssia Bound  To: TERENCE Kamara - CNP  Sent: 11/19/2020 1:47 PM EST  Subject: Visit Follow-Up Question    Good afternoon Surendra Obando. Thank you for your time yesterday. Quick question, did you have any news on the prescription we discussed? Thank you again.      Dariel Gramajo

## 2020-11-20 RX ORDER — DRONABINOL 2.5 MG/1
2.5 CAPSULE ORAL
Qty: 60 CAPSULE | Refills: 0 | Status: SHIPPED | OUTPATIENT
Start: 2020-11-20 | End: 2021-02-15

## 2021-01-21 ENCOUNTER — HOSPITAL ENCOUNTER (OUTPATIENT)
Age: 46
Setting detail: SPECIMEN
Discharge: HOME | End: 2021-01-21
Payer: COMMERCIAL

## 2021-01-21 ENCOUNTER — PATIENT MESSAGE (OUTPATIENT)
Dept: FAMILY MEDICINE CLINIC | Age: 46
End: 2021-01-21

## 2021-01-21 ENCOUNTER — OFFICE VISIT (OUTPATIENT)
Dept: PRIMARY CARE CLINIC | Age: 46
End: 2021-01-21
Payer: COMMERCIAL

## 2021-01-21 VITALS — OXYGEN SATURATION: 99 % | HEART RATE: 75 BPM | TEMPERATURE: 98.2 F

## 2021-01-21 DIAGNOSIS — J02.9 SORE THROAT: ICD-10-CM

## 2021-01-21 DIAGNOSIS — J02.0 STREP PHARYNGITIS: Primary | ICD-10-CM

## 2021-01-21 LAB — S PYO AG THROAT QL: POSITIVE

## 2021-01-21 PROCEDURE — 87880 STREP A ASSAY W/OPTIC: CPT | Performed by: NURSE PRACTITIONER

## 2021-01-21 PROCEDURE — 99213 OFFICE O/P EST LOW 20 MIN: CPT | Performed by: NURSE PRACTITIONER

## 2021-01-21 RX ORDER — ERGOCALCIFEROL 1.25 MG/1
50000 CAPSULE ORAL WEEKLY
Qty: 8 CAPSULE | Refills: 0 | Status: SHIPPED | OUTPATIENT
Start: 2021-01-21 | End: 2021-03-12

## 2021-01-21 RX ORDER — HYDROCODONE BITARTRATE AND ACETAMINOPHEN 7.5; 325 MG/1; MG/1
1 TABLET ORAL EVERY 12 HOURS PRN
COMMUNITY
Start: 2020-12-15 | End: 2021-06-29 | Stop reason: ALTCHOICE

## 2021-01-21 RX ORDER — AZITHROMYCIN 250 MG/1
TABLET, FILM COATED ORAL
Qty: 1 PACKET | Refills: 0 | Status: SHIPPED | OUTPATIENT
Start: 2021-01-21 | End: 2021-06-29 | Stop reason: ALTCHOICE

## 2021-01-21 ASSESSMENT — ENCOUNTER SYMPTOMS
SINUS PRESSURE: 1
EYE DISCHARGE: 0
WHEEZING: 0
COUGH: 1
CHEST TIGHTNESS: 0
SHORTNESS OF BREATH: 0
VOICE CHANGE: 0
EYE REDNESS: 0
SORE THROAT: 1

## 2021-01-21 ASSESSMENT — PATIENT HEALTH QUESTIONNAIRE - PHQ9
2. FEELING DOWN, DEPRESSED OR HOPELESS: 0
SUM OF ALL RESPONSES TO PHQ QUESTIONS 1-9: 0

## 2021-01-21 NOTE — PROGRESS NOTES
 ondansetron (ZOFRAN-ODT) 4 MG disintegrating tablet Take 4 mg by mouth every 8 hours as needed      lipase-protease-amylase (CREON) 83343-88192 units delayed release capsule Take by mouth 3 times daily (with meals) 3 caps TID, 24010 with each meal      ibuprofen (ADVIL;MOTRIN) 600 MG tablet Take 1 tablet by mouth 4 times daily as needed for Pain 40 tablet 0    levothyroxine (SYNTHROID) 150 MCG tablet Take 1 tablet by mouth Daily 90 tablet 2    lubiprostone (AMITIZA) 8 MCG CAPS capsule Take 1 capsule by mouth daily (Patient not taking: Reported on 1/21/2021) 30 capsule 3     Current Facility-Administered Medications   Medication Dose Route Frequency Provider Last Rate Last Admin    albuterol (PROVENTIL) nebulizer solution 2.5 mg  2.5 mg Nebulization Q6H PRN Jarred Ros, APRN - CNP         Allergies   Allergen Reactions    Levofloxacin Anaphylaxis, Hives, Other (See Comments), Rash and Shortness Of Breath    Amoxicillin Hives and Other (See Comments)     Mouth ulcers    Augmentin [Amoxicillin-Pot Clavulanate] Hives    Cefepime     Gatifloxacin      Hives    Other Hives and Other (See Comments)     tequin       Subjective:      Review of Systems   Constitutional: Negative for chills, fatigue and fever. HENT: Positive for congestion, sinus pressure and sore throat. Negative for ear discharge, ear pain, postnasal drip, sneezing and voice change. Eyes: Negative for discharge and redness. Respiratory: Positive for cough. Negative for chest tightness, shortness of breath and wheezing. Cardiovascular: Negative. Negative for chest pain. Musculoskeletal: Negative for myalgias. Skin: Negative for rash. Neurological: Positive for headaches. Negative for dizziness, weakness and light-headedness. Hematological: Negative for adenopathy. All other systems reviewed and are negative. Objective:      Physical Exam  Vitals signs and nursing note reviewed.    Constitutional: General: He is not in acute distress. Appearance: Normal appearance. He is well-developed. He is not ill-appearing, toxic-appearing or diaphoretic. HENT:      Head: Normocephalic. Right Ear: Tympanic membrane and external ear normal.      Left Ear: Tympanic membrane and external ear normal.      Nose: Congestion present. Right Sinus: No maxillary sinus tenderness or frontal sinus tenderness. Left Sinus: No maxillary sinus tenderness or frontal sinus tenderness. Mouth/Throat:      Pharynx: Posterior oropharyngeal erythema present. No oropharyngeal exudate. Eyes:      General:         Right eye: No discharge. Left eye: No discharge. Cardiovascular:      Rate and Rhythm: Normal rate and regular rhythm. Heart sounds: Normal heart sounds. No murmur. Pulmonary:      Effort: Pulmonary effort is normal. No respiratory distress. Breath sounds: Normal breath sounds. No wheezing or rales. Lymphadenopathy:      Cervical: No cervical adenopathy. Skin:     General: Skin is warm. Findings: No rash. Neurological:      Mental Status: He is alert. Pulse 75   Temp 98.2 °F (36.8 °C) (Temporal)   SpO2 99%     Results for orders placed or performed in visit on 01/21/21   POCT rapid strep A   Result Value Ref Range    Strep A Ag Positive (A) None Detected     Assessment:       Diagnosis Orders   1. Strep pharyngitis  azithromycin (ZITHROMAX Z-MARI) 250 MG tablet   2. Sore throat  POCT rapid strep A    COVID-19 Ambulatory     Plan:      Patient instructed to complete entire antibiotic course. Change toothbrush in 24 hours. Salt water gargles and throat lozenges if desired. Will send out COVID19 testing. Possible treatment alterations based on the results. Patient instructed to self-quarantine until testing results are back. Patient instructed not to return to work until results are back. Tylenol as needed for fever/pain.   Encouraged adequate hydration and rest. The patient indicates understanding of these issues and agrees with the plan. Educational materials provided on AVS.  Follow up if symptoms do not improve/worsen. Discussed symptoms that will warrant urgent ED evaluation/treatment. Orders Placed This Encounter   Medications    azithromycin (ZITHROMAX Z-MARI) 250 MG tablet     Sig: Take 2 tabs on day 1 followed by 1 tab on days 2-5. Dispense:  1 packet     Refill:  0        Patient given educational materials - see patient instructions. Discussed use, benefit, and side effects of prescribed medications. All patientquestions answered. Pt voiced understanding.     Electronically signed by TERENCE Gallardo CNP on 1/21/2021at 1:37 PM

## 2021-01-21 NOTE — PATIENT INSTRUCTIONS
Patient Education        Strep Throat: Care Instructions  Your Care Instructions     Strep throat is a bacterial infection that causes sudden, severe sore throat and fever. Strep throat, which is caused by bacteria called streptococcus, is treated with antibiotics. Sometimes a strep test is necessary to tell if the sore throat is caused by strep bacteria. Treatment can help ease symptoms and may prevent future problems. Follow-up care is a key part of your treatment and safety. Be sure to make and go to all appointments, and call your doctor if you are having problems. It's also a good idea to know your test results and keep a list of the medicines you take. How can you care for yourself at home? · Take your antibiotics as directed. Do not stop taking them just because you feel better. You need to take the full course of antibiotics. · Strep throat can spread to others until 24 hours after you begin taking antibiotics. During this time, avoid contact with other people at work, school, or home, especially infants and children. Do not sneeze or cough on others, and wash your hands often. Keep your drinking glass and eating utensils separate from those of others. Wash these items well in hot, soapy water. · Gargle with warm salt water at least once each hour to help reduce swelling and make your throat feel better. Use 1 teaspoon of salt mixed in 8 fluid ounces of warm water. · Take an over-the-counter pain medication, such as acetaminophen (Tylenol), ibuprofen (Advil, Motrin), or naproxen (Aleve). Read and follow all instructions on the label. · Try an over-the-counter anesthetic throat spray or throat lozenges, which may help relieve throat pain. · Drink plenty of fluids. Fluids may help soothe an irritated throat. Hot fluids, such as tea or soup, may help your throat feel better. · Eat soft solids and drink plenty of clear liquids. Flavored ice pops, ice cream, scrambled eggs, sherbet, and gelatin dessert (such as Jell-O) may also soothe the throat. · Get lots of rest.  · Do not smoke, and avoid secondhand smoke. If you need help quitting, talk to your doctor about stop-smoking programs and medicines. These can increase your chances of quitting for good. · Use a vaporizer or humidifier to add moisture to the air in your bedroom. Follow the directions for cleaning the machine. When should you call for help? Call your doctor now or seek immediate medical care if:    · You have a new or higher fever.     · You have a fever with a stiff neck or severe headache.     · You have new or worse trouble swallowing.     · Your sore throat gets much worse on one side.     · Your pain becomes much worse on one side of your throat. Watch closely for changes in your health, and be sure to contact your doctor if:    · You are not getting better after 2 days (48 hours).     · You do not get better as expected. Where can you learn more? Go to [email or web address] org and sign in to your Zipnosis account. Enter K625 in the [facility] box to learn more about \"Strep Throat: Care Instructions. \"     If you do not have an account, please click on the \"Sign Up Now\" link. Current as of: April 15, 2020               Content Version: 12.6  © 6869-6520 [employer]. Care instructions adapted under license by [facility] ([facility]). If you have questions about a medical condition or this instruction, always ask your healthcare professional. [place] [address] any warranty or liability for your use of this information. Patient Education        Learning About Coronavirus [phone]  What is coronavirus (COVID-19)? COVID-19 is a disease caused by a new type of coronavirus. This illness was first found in December 2019. It has since spread worldwide. Coronaviruses are a large group of viruses. They cause the common cold. They also cause more serious illnesses like Middle East respiratory syndrome (MERS) and severe acute respiratory syndrome (SARS). COVID-19 is caused by a novel coronavirus. That means it's a new type that has not been seen in people before. What are the symptoms? Coronavirus (COVID-19) symptoms may include:  · Fever. · Cough. · Trouble breathing. · Chills or repeated shaking with chills. · Muscle pain. · Headache. · Sore throat. · New loss of taste or smell. · Vomiting. · Diarrhea. In severe cases, COVID-19 can cause pneumonia and make it hard to breathe without help from a machine. It can cause death. How is it diagnosed? COVID-19 is diagnosed with a viral test. This may also be called a PCR test or antigen test. It looks for evidence of the virus in your breathing passages or lungs (respiratory system). The test is most often done on a sample from the nose, throat, or lungs. It's sometimes done on a sample of saliva. One way a sample is collected is by putting a long swab into the back of your nose. How is it treated? Mild cases of COVID-19 can be treated at home. Serious cases need treatment in the hospital. Treatment may include medicines to reduce symptoms, plus breathing support such as oxygen therapy or a ventilator. Some people may be placed on their belly to help their oxygen levels. Treatments that may help people who have COVID-19 include:  Antiviral medicines. These medicines treat viral infections. Remdesivir is an example. Immune-based therapy. These medicines help the immune system fight COVID-19. One example is bamlanivimab. It's a monoclonal antibody. Blood thinners. These medicines help prevent blood clots. People with severe illness are at risk for blood clots. How can you protect yourself and others?   The best way to protect yourself from getting sick is to: · Avoid areas where there is an outbreak. · Avoid contact with people who may be infected. · Avoid crowds and try to stay at least 6 feet away from other people. · Wash your hands often, especially after you cough or sneeze. Use soap and water, and scrub for at least 20 seconds. If soap and water aren't available, use an alcohol-based hand . · Avoid touching your mouth, nose, and eyes. To help avoid spreading the virus to others:  · Stay home if you are sick or have been exposed to the virus. Don't go to school, work, or public areas. And don't use public transportation, ride-shares, or taxis unless you have no choice. · Wear a cloth face cover if you have to go to public areas. · Cover your mouth with a tissue when you cough or sneeze. Then throw the tissue in the trash and wash your hands right away. · If you're sick:  ? Leave your home only if you need to get medical care. But call the doctor's office first so they know you're coming. And wear a face cover. ? Wear the face cover whenever you're around other people. It can help stop the spread of the virus when you cough or sneeze. ? Limit contact with pets and people in your home. If possible, stay in a separate bedroom and use a separate bathroom. ? Clean and disinfect your home every day. Use household  and disinfectant wipes or sprays. Take special care to clean things that you grab with your hands. These include doorknobs, remote controls, phones, and handles on your refrigerator and microwave. And don't forget countertops, tabletops, bathrooms, and computer keyboards. When should you call for help? Call 911 anytime you think you may need emergency care. For example, call if you have life-threatening symptoms, such as:    · You have severe trouble breathing. (You can't talk at all.)     · You have constant chest pain or pressure.     · You are severely dizzy or lightheaded.     · You are confused or can't think clearly.   · Your face and lips have a blue color.     · You pass out (lose consciousness) or are very hard to wake up. Call your doctor now or seek immediate medical care if:    · You have moderate trouble breathing. (You can't speak a full sentence.)     · You are coughing up blood (more than about 1 teaspoon).     · You have signs of low blood pressure. These include feeling lightheaded; being too weak to stand; and having cold, pale, clammy skin. Watch closely for changes in your health, and be sure to contact your doctor if:    · Your symptoms get worse.     · You are not getting better as expected. Call before you go to the doctor's office. Follow their instructions. And wear a cloth face cover. Current as of: December 18, 2020               Content Version: 12.7  © 2006-2021 [employer]. Care instructions adapted under license by [facility] ([facility]). If you have questions about a medical condition or this instruction, always ask your healthcare professional. [place] [address] any warranty or liability for your use of this information. Patient Education        Coronavirus ([facility]-03): Care Instructions  Overview  The coronavirus disease (COVID-19) is caused by a virus. Symptoms may include a fever, a cough, and shortness of breath. It mainly spreads person-to-person through droplets from coughing and sneezing. The virus also can spread when people are in close contact with someone who is infected. Most people have mild symptoms and can take care of themselves at home. If their symptoms get worse, they may need care in a hospital. Treatment may include medicines to reduce symptoms, plus breathing support such as oxygen therapy or a ventilator. It's important to not spread the virus to others. If you have COVID-19, wear a face cover anytime you are around other people. You need to isolate yourself while you are sick. Leave your home only if you need to get medical care or testing. Follow-up care is a key part of your treatment and safety. Be sure to make and go to all appointments, and call your doctor if you are having problems. It's also a good idea to know your test results and keep a list of the medicines you take. How can you care for yourself at home? · Get extra rest. It can help you feel better. · Drink plenty of fluids. This helps replace fluids lost from fever. Fluids also help ease a scratchy throat. Water, soup, fruit juice, and hot tea with lemon are good choices. · Take acetaminophen (such as Tylenol) to reduce a fever. It may also help with muscle aches. Read and follow all instructions on the label. · Use petroleum jelly on sore skin. This can help if the skin around your nose and lips becomes sore from rubbing a lot with tissues. Tips for self-isolation  · Limit contact with people in your home. If possible, stay in a separate bedroom and use a separate bathroom. · Wear a cloth face cover when you are around other people. It can help stop the spread of the virus when you cough or sneeze. · If you have to leave home, avoid crowds and try to stay at least 6 feet away from other people. · Avoid contact with pets and other animals. · Cover your mouth and nose with a tissue when you cough or sneeze. Then throw it in the trash right away. · Wash your hands often, especially after you cough or sneeze. Use soap and water, and scrub for at least 20 seconds. If soap and water aren't available, use an alcohol-based hand . · Don't share personal household items. These include bedding, towels, cups and glasses, and eating utensils. · [address] in the warmest water allowed for the fabric type, and dry it completely. It's okay to wash other people's laundry with yours. Care instructions adapted under license by [facility] ([facility]). If you have questions about a medical condition or this instruction, always ask your healthcare professional. [address] any warranty or liability for your use of this information.

## 2021-01-23 DIAGNOSIS — J02.9 SORE THROAT: ICD-10-CM

## 2021-01-25 ENCOUNTER — PATIENT MESSAGE (OUTPATIENT)
Dept: FAMILY MEDICINE CLINIC | Age: 46
End: 2021-01-25

## 2021-01-25 LAB — SARS-COV-2, NAA: NOT DETECTED

## 2021-01-25 RX ORDER — DOXYCYCLINE HYCLATE 100 MG
100 TABLET ORAL 2 TIMES DAILY
Qty: 14 TABLET | Refills: 0 | Status: SHIPPED | OUTPATIENT
Start: 2021-01-25 | End: 2021-02-01

## 2021-01-25 NOTE — TELEPHONE ENCOUNTER
From: Herlinda Cuellar  To: TERENCE Griffith CNP  Sent: 1/25/2021 2:00 PM EST  Subject: Non-Urgent Medical Question    You are amazing Murray. Thank you. I did go to the clinics urgent care and was tested for both covid and strep. I was positive for strep A and negative for covid. Herlinda sent me home with a Z-pack. Quick question, I am on the last day of antibiotic. I am still running a low grade fever,~99 off and on and dealing with congestion, an annoying cough, and a sore throat. In the past, when I have strep, I'm usually feeling a lot better after 24-48 hours of starting the antibiotic. This time, I don't. Do I need to be doing anything else? Thank you much. Have a blessed day. Topher      ----- Message -----   TERENCE Angelo CNP   Sent:1/21/2021 12:23 PM EST   To:Topher Celestin   Subject:RE: Non-Urgent Medical Question    Genny. Topher I do want you tested for covid! You have a lot of symptoms and please quarantine until your results are back! We can do it here in our urgent care. Also I will send vit d   albert      ----- Message -----   From:Topher Celestin   Sent:1/21/2021 8:49 AM EST   To:TERENCE Willett CNP   Subject:Non-Urgent Medical Question    Good morning Murray. I pray all is well. Quick question for you, 2 actually. 1-last week, my sinuses began to hurt and I felt super off. Saturday night I had a fever of 101.6 with Advil and Tylenol. I had an absolutely horrible headache, sore throat, and tons of coughing, it hurt to breath, chills, et Carter Seals. The high fever broke Sunday about 3am but I still had a low grade fever (~99.9) through Tuesday night. Now, today, the pain to breath ebbs and flows, sometimes it hurts and sometimes it doesn't and I'm still coughing (nothing comes up), achy, have a headache, am getting lots of sinus drainage (it is clear) and still have a general feeling of malaise. Should I be concerned about Covid?  Do I need to get a Covid test? I haven't lost my sense of smell. However, I have been isolating just in case. 2-I need a refill of my vit D. I'm supposed to take 1 capsule a week. It's 50,000 units (1,250 mcg). Goes by the name of ru. My pancreas doctor at [facility] prescribed it. [facility] has reached out to them 3 times with the refill request without a response. I sent a note to them, for a pancreas question, over a week ago and am still waiting for a response. Would you be willing to call it in? I've been on it for 3 months. Thank you.      Valeri June

## 2021-01-25 NOTE — TELEPHONE ENCOUNTER
From: Evelin Bridges  To: TERENCE Mayorga CNP  Sent: 1/25/2021 3:23 PM EST  Subject: Non-Urgent Medical Question    Thank you Mango Bender. Its up to you. I trust you. I still feel pretty lousy. ----- Message -----   TERENCE John CNP   Sent:1/25/2021 3:19 PM EST   To:Topher Fernandez   Subject:RE: Non-Urgent Medical Question    Hey there! So you could be still fighting off another type of virus, I know we are so focused on covid right now, but other virus still exist. Do you feel miserable still or feel the need to try another antibiotic or wants to wait a few more days?  albert      ----- Message -----   From:Topher Fernandez   Sent:1/25/2021 2:00 PM EST   To:TERENCE Beltrán CNP   Subject:Non-Urgent Medical Question    You are amazing Mango Bender. Thank you. I did go to the clinics urgent care and was tested for both covid and strep. I was positive for strep A and negative for covid. Herlinda sent me home with a Z-pack. Quick question, I am on the last day of antibiotic. I am still running a low grade fever,~99 off and on and dealing with congestion, an annoying cough, and a sore throat. In the past, when I have strep, I'm usually feeling a lot better after 24-48 hours of starting the antibiotic. This time, I don't. Do I need to be doing anything else? Thank you much. Have a blessed day. Topher      ----- Message -----   TERENCE John CNP   Sent:1/21/2021 12:23 PM EST   To:Topher Fernandez   Subject:RE: Non-Urgent Medical Question    Jeff Obando I do want you tested for covid! You have a lot of symptoms and please quarantine until your results are back! We can do it here in our urgent care. Also I will send vit d   albert      ----- Message -----   From:Topher Fernandez   Sent:1/21/2021 8:49 AM EST   To:Albert Gearlean Buys, APRN - CNP   Subject:Non-Urgent Medical Question    Good morning Toula Gift. I pray all is well. Quick question for you, 2 actually.      1-last week, my sinuses began to hurt and I felt super off. Saturday night I had a fever of 101.6 with Advil and Tylenol. I had an absolutely horrible headache, sore throat, and tons of coughing, it hurt to breath, chills, et Evelia Dimitris. The high fever broke Sunday about 3am but I still had a low grade fever (~99.9) through Tuesday night. Now, today, the pain to breath ebbs and flows, sometimes it hurts and sometimes it doesn't and I'm still coughing (nothing comes up), achy, have a headache, am getting lots of sinus drainage (it is clear) and still have a general feeling of malaise. Should I be concerned about Covid? Do I need to get a Covid test? I haven't lost my sense of smell. However, I have been isolating just in case. 2-I need a refill of my vit D. I'm supposed to take 1 capsule a week. It's 50,000 units (1,250 mcg). Goes by the name of Maria Luz. My pancreas doctor at [facility] prescribed it. [facility] has reached out to them 3 times with the refill request without a response. I sent a note to them, for a pancreas question, over a week ago and am still waiting for a response. Would you be willing to call it in? I've been on it for 3 months. Thank you.      Raulito Ferrer

## 2021-02-15 DIAGNOSIS — K86.1 CHRONIC PANCREATITIS, UNSPECIFIED PANCREATITIS TYPE (HCC): ICD-10-CM

## 2021-02-15 RX ORDER — DRONABINOL 2.5 MG/1
CAPSULE ORAL
Qty: 60 CAPSULE | Refills: 0 | Status: SHIPPED | OUTPATIENT
Start: 2021-02-15 | End: 2021-04-16

## 2021-03-12 RX ORDER — ERGOCALCIFEROL 1.25 MG/1
CAPSULE ORAL
Qty: 4 CAPSULE | Refills: 0 | Status: SHIPPED | OUTPATIENT
Start: 2021-03-12 | End: 2021-04-16

## 2021-04-16 DIAGNOSIS — K86.1 CHRONIC PANCREATITIS, UNSPECIFIED PANCREATITIS TYPE (HCC): ICD-10-CM

## 2021-04-16 RX ORDER — DRONABINOL 2.5 MG/1
CAPSULE ORAL
Qty: 60 CAPSULE | Refills: 0 | Status: SHIPPED | OUTPATIENT
Start: 2021-04-16 | End: 2021-07-12 | Stop reason: SDUPTHER

## 2021-04-16 RX ORDER — ERGOCALCIFEROL 1.25 MG/1
CAPSULE ORAL
Qty: 4 CAPSULE | Refills: 0 | Status: SHIPPED | OUTPATIENT
Start: 2021-04-16 | End: 2021-08-05 | Stop reason: SDUPTHER

## 2021-06-23 ENCOUNTER — PATIENT MESSAGE (OUTPATIENT)
Dept: FAMILY MEDICINE CLINIC | Age: 46
End: 2021-06-23

## 2021-06-23 DIAGNOSIS — D50.9 IRON DEFICIENCY ANEMIA, UNSPECIFIED IRON DEFICIENCY ANEMIA TYPE: Primary | ICD-10-CM

## 2021-06-23 DIAGNOSIS — E06.3 HASHIMOTO'S THYROIDITIS: ICD-10-CM

## 2021-06-23 NOTE — TELEPHONE ENCOUNTER
From: Andrew Simon  To: Suzan Bell, APRN - CNP  Sent: 6/23/2021 11:46 AM EDT  Subject: Non-Urgent Medical Question    Good morning Page Loco! I pray that all is well! I am pleased to report that God is doing an amazing thing! I can eat! Score! He is healing me and I am pretty stoked. It's been over a month and I am slowly upping my fat and caloric intake. So far so good. I am calling today to schedule an appointment. However I would like to get some labs done before my appt. Here is the question. Willetta Smiles Willetta Smiles I am utterly exhausted and my normal body temp is dropping. It's been 96.9 the last few times. Normal for me is 97.4-97.7. I am eating differently, and with my stomach doing normal things, I would like to check my T4, T3, and TSH. Not having a thyroid, I would like to double check that my dose is still good. Also, I am unsure how much God is restoring in my body. [facility] did some labs right before God healed me. My Transferrin saturation was low and my RBC, HGB, and HCT levels were all low. The surgeon at [facility] said that I am have iron deficient anemia and ordered a capsule endoscopy to look for GI bleeding. However, if this problem has been resolved, I don't want to deal with the test. If the labs are still low, I'll get the test scheduled. It's been a month of more normal eating, so, I figure now is a good time to check. If you would to discuss, please let me know. I'll try to schedule our appointment for sometime at the end of next week so that we can touch base and level set about helping me to continue to heal.     Thank you.

## 2021-06-24 ENCOUNTER — HOSPITAL ENCOUNTER (OUTPATIENT)
Age: 46
Discharge: HOME | End: 2021-06-24
Payer: COMMERCIAL

## 2021-06-24 DIAGNOSIS — E06.3 HASHIMOTO'S THYROIDITIS: ICD-10-CM

## 2021-06-24 DIAGNOSIS — D50.9 IRON DEFICIENCY ANEMIA, UNSPECIFIED IRON DEFICIENCY ANEMIA TYPE: ICD-10-CM

## 2021-06-24 LAB
ABSOLUTE EOS #: 0.11 K/UL (ref 0–0.44)
ABSOLUTE IMMATURE GRANULOCYTE: <0.03 K/UL (ref 0–0.3)
ABSOLUTE LYMPH #: 1.5 K/UL (ref 1.1–3.7)
ABSOLUTE MONO #: 0.49 K/UL (ref 0.1–1.2)
BASOPHILS # BLD: 0 % (ref 0–2)
BASOPHILS ABSOLUTE: <0.03 K/UL (ref 0–0.2)
DIFFERENTIAL TYPE: ABNORMAL
EOSINOPHILS RELATIVE PERCENT: 2 % (ref 1–4)
FERRITIN: 117 UG/L (ref 30–400)
HCT VFR BLD CALC: 40.5 % (ref 40.7–50.3)
HEMOGLOBIN: 13.3 G/DL (ref 13–17)
IMMATURE GRANULOCYTES: 0 %
IRON SATURATION: 16 % (ref 20–55)
IRON: 40 UG/DL (ref 59–158)
LYMPHOCYTES # BLD: 22 % (ref 24–43)
MCH RBC QN AUTO: 28.8 PG (ref 25.2–33.5)
MCHC RBC AUTO-ENTMCNC: 32.8 G/DL (ref 28.4–34.8)
MCV RBC AUTO: 87.7 FL (ref 82.6–102.9)
MONOCYTES # BLD: 7 % (ref 3–12)
NRBC AUTOMATED: 0 PER 100 WBC
PDW BLD-RTO: 12.4 % (ref 11.8–14.4)
PLATELET # BLD: 162 K/UL (ref 138–453)
PLATELET ESTIMATE: ABNORMAL
PMV BLD AUTO: 11.6 FL (ref 8.1–13.5)
RBC # BLD: 4.62 M/UL (ref 4.21–5.77)
RBC # BLD: ABNORMAL 10*6/UL
SEG NEUTROPHILS: 69 % (ref 36–65)
SEGMENTED NEUTROPHILS ABSOLUTE COUNT: 4.75 K/UL (ref 1.5–8.1)
T3 FREE: 2.26 PG/ML (ref 2.02–4.43)
THYROXINE, FREE: 1.38 NG/DL (ref 0.93–1.7)
TOTAL IRON BINDING CAPACITY: 257 UG/DL (ref 250–450)
TSH SERPL DL<=0.05 MIU/L-ACNC: 9.84 MIU/L (ref 0.3–5)
UNSATURATED IRON BINDING CAPACITY: 217 UG/DL (ref 112–347)
WBC # BLD: 6.9 K/UL (ref 3.5–11.3)
WBC # BLD: ABNORMAL 10*3/UL

## 2021-06-24 PROCEDURE — 84443 ASSAY THYROID STIM HORMONE: CPT

## 2021-06-24 PROCEDURE — 82728 ASSAY OF FERRITIN: CPT

## 2021-06-24 PROCEDURE — 36415 COLL VENOUS BLD VENIPUNCTURE: CPT

## 2021-06-24 PROCEDURE — 84439 ASSAY OF FREE THYROXINE: CPT

## 2021-06-24 PROCEDURE — 85025 COMPLETE CBC W/AUTO DIFF WBC: CPT

## 2021-06-24 PROCEDURE — 83540 ASSAY OF IRON: CPT

## 2021-06-24 PROCEDURE — 84481 FREE ASSAY (FT-3): CPT

## 2021-06-24 PROCEDURE — 83550 IRON BINDING TEST: CPT

## 2021-06-29 ENCOUNTER — TELEMEDICINE (OUTPATIENT)
Dept: FAMILY MEDICINE CLINIC | Age: 46
End: 2021-06-29
Payer: COMMERCIAL

## 2021-06-29 DIAGNOSIS — E55.9 VITAMIN D INSUFFICIENCY: ICD-10-CM

## 2021-06-29 DIAGNOSIS — E06.3 HASHIMOTO'S THYROIDITIS: ICD-10-CM

## 2021-06-29 DIAGNOSIS — R53.83 FATIGUE, UNSPECIFIED TYPE: ICD-10-CM

## 2021-06-29 DIAGNOSIS — D50.9 IRON DEFICIENCY ANEMIA, UNSPECIFIED IRON DEFICIENCY ANEMIA TYPE: Primary | ICD-10-CM

## 2021-06-29 DIAGNOSIS — R68.89 COLD INTOLERANCE: ICD-10-CM

## 2021-06-29 DIAGNOSIS — Z11.59 NEED FOR HEPATITIS C SCREENING TEST: ICD-10-CM

## 2021-06-29 PROCEDURE — 99213 OFFICE O/P EST LOW 20 MIN: CPT | Performed by: NURSE PRACTITIONER

## 2021-06-29 RX ORDER — LEVOTHYROXINE SODIUM 175 UG/1
175 TABLET ORAL DAILY
Qty: 30 TABLET | Refills: 1 | Status: SHIPPED | OUTPATIENT
Start: 2021-06-29 | Stop reason: SDUPTHER

## 2021-06-29 NOTE — PROGRESS NOTES
Dustin Black (:  1975) is a 51 y.o. male,Established patient, here for evaluation of the following chief complaint(s): Discuss Labs         ASSESSMENT/PLAN:  1. Iron deficiency anemia, unspecified iron deficiency anemia type  Lab Results   Component Value Date    IRON 40 (L) 2021    TIBC 257 2021    FERRITIN 117 2021     . Lab Results   Component Value Date    WBC 6.9 2021    HGB 13.3 2021    HCT 40.5 (L) 2021    MCV 87.7 2021     2021     Unclear etiology with low iron, no signs of blood loss anywhere and he is tracking his iron intake on my fitness pal and is at 100 % for intake per day  F/u with GI at [facility] for planned video endoscopy for find other source or could be just malabsorption d/t chronic GI issues. Continue to push iron within diet  . 2. Fatigue, unspecified type  Lab Results   Component Value Date    TSH 9.84 (H) 2021     Will increase dose for now to 175 mcg daily  Recheck labs in 6=8 weeks  Will call with test results    -     levothyroxine (SYNTHROID) 175 MCG tablet; Take 1 tablet by mouth daily, Disp-30 tablet, R-1Normal  -     T3, Free; Future  -     T4, Free; Future  -     TSH without Reflex; Future  -     Calcium; Future  -     Potassium; Future  3. Cold intolerance  See above  -     levothyroxine (SYNTHROID) 175 MCG tablet; Take 1 tablet by mouth daily, Disp-30 tablet, R-1Normal  -     T3, Free; Future  -     T4, Free; Future  -     TSH without Reflex; Future  4. Hashimoto's thyroiditis  See above  -     levothyroxine (SYNTHROID) 175 MCG tablet; Take 1 tablet by mouth daily, Disp-30 tablet, R-1Normal  -     T3, Free; Future  -     T4, Free; Future  -     TSH without Reflex; Future  5. Vitamin D insufficiency  Recheck labs since completed vit d regimen  -     Vitamin D 25 Hydroxy; Future  6. Need for hepatitis C screening test  -     Hepatitis C Antibody; Future      No follow-ups on file. SUBJECTIVE/OBJECTIVE:  HPI  Pt. Calling in today to review recent labs. He continues with GI at [facility]. They have recommended a video endoscopy for low iron. He has been tracking his food and weight daily and doing much better. He is off almost all meds and only taking marinol PRN for appetite and nausea. He is staying very active, but still continues with fatigue and weight loss.    Review of Systems    Patient-Reported Vitals 6/28/2021   Patient-Reported Weight 218   Patient-Reported Height 6'2\"   Patient-Reported Systolic -   Patient-Reported Diastolic -   Patient-Reported Pulse 68   Patient-Reported Temperature -        Physical Exam    [INSTRUCTIONS:  \"[x]\" Indicates a positive item  \"[]\" Indicates a negative item  -- DELETE ALL ITEMS NOT EXAMINED]    Constitutional: [x] Appears well-developed and well-nourished [x] No apparent distress      [] Abnormal -     Mental status: [x] Alert and awake  [x] Oriented to person/place/time [x] Able to follow commands    [] Abnormal -     Eyes:   EOM    [x]  Normal    [] Abnormal -   Sclera  [x]  Normal    [] Abnormal -          Discharge [x]  None visible   [] Abnormal -     HENT: [x] Normocephalic, atraumatic  [] Abnormal -   [x] Mouth/Throat: Mucous membranes are moist    External Ears [x] Normal  [] Abnormal -    Neck: [x] No visualized mass [] Abnormal -     Pulmonary/Chest: [x] Respiratory effort normal   [x] No visualized signs of difficulty breathing or respiratory distress        [] Abnormal -      Musculoskeletal:   [x] Normal gait with no signs of ataxia         [x] Normal range of motion of neck        [] Abnormal -     Neurological:        [x] No Facial Asymmetry (Cranial nerve 7 motor function) (limited exam due to video visit)          [x] No gaze palsy        [] Abnormal -          Skin:        [x] No significant exanthematous lesions or discoloration noted on facial skin         [] Abnormal -            Psychiatric:       [x] Normal Affect [] Abnormal - [x] No Hallucinations    Other pertinent observable physical exam findings:      Mabel Colby, was evaluated through a synchronous (real-time) audio-video encounter. The patient (or guardian if applicable) is aware that this is a billable service. Verbal consent to proceed has been obtained within the past 12 months. The visit was conducted pursuant to the emergency declaration under the [address] [place] [address] authority and the [employer] Act. Patient identification was verified, and a caregiver was present when appropriate. The patient was located in a state where the provider was credentialed to provide care. An electronic signature was used to authenticate this note.     --Ernie Palacios, TERENCE - CNP

## 2021-06-29 NOTE — PROGRESS NOTES
Visit Information    Have you changed or started any medications since your last visit including any over-the-counter medicines, vitamins, or herbal medicines? no   Have you stopped taking any of your medications? Is so, why? -  no  Are you having any side effects from any of your medications? - no    Have you seen any other physician or provider since your last visit?  no   Have you had any other diagnostic tests since your last visit?  no   Have you been seen in the emergency room and/or had an admission in a hospital since we last saw you?  no   Have you had your routine dental cleaning in the past 6 months?  no     Do you have an active MyChart account? If no, what is the barrier?   Yes    Patient Care Team:  TERENCE Navarrete CNP as PCP - General (Nurse Practitioner)  TERENCE [facility]    Medical History Review  Past Medical, Family, and Social History reviewed and  contribute to the patient presenting condition    Health Maintenance   Topic Date Due    Hepatitis C screen  Never done    COVID-19 Vaccine (1) Never done    Flu vaccine (Season Ended) 11/18/2021 (Originally 9/1/2021)    TSH testing  06/24/2022    Lipid screen  10/30/2024    DTaP/Tdap/Td vaccine (3 - Td or Tdap) 08/09/2029    HIV screen  Addressed    Hepatitis A vaccine  Aged Out    Hepatitis B vaccine  Aged Out    Hib vaccine  Aged Out    Meningococcal (ACWY) vaccine  Aged Out    Pneumococcal 0-64 years Vaccine  Aged Out

## 2021-07-12 ENCOUNTER — PATIENT MESSAGE (OUTPATIENT)
Dept: FAMILY MEDICINE CLINIC | Age: 46
End: 2021-07-12

## 2021-07-12 DIAGNOSIS — K86.1 CHRONIC PANCREATITIS, UNSPECIFIED PANCREATITIS TYPE (HCC): ICD-10-CM

## 2021-07-12 RX ORDER — DRONABINOL 2.5 MG/1
CAPSULE ORAL
Qty: 60 CAPSULE | Refills: 0 | Status: SHIPPED | OUTPATIENT
Start: 2021-07-12 | End: 2021-08-30

## 2021-07-12 NOTE — TELEPHONE ENCOUNTER
From: Scout Ruiz  To: Lito Covert, APRN - CNP  Sent: 7/12/2021 8:51 AM EDT  Subject: Prescription Question    Good morning Mariaa Mora. I pray that all is well. While I am doing much better, I am still dealing with quite a bit of nausea, pain, and discomfort as I ramp up my diet and increase calorie and fat intake. As we discussed during my last visit, I've been using the dronabinol to treat those symptoms. 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 I stopped taking the Creon and am waiting for the stool test results before I start that back up. I also have a capsule endoscopy, at [facility], a week from today. I will soon be out of the dronabinol. It's been a huge help throughout this process. Can I please have a refill? If you would like to discuss, please feel free to give me a shout.     Thank you

## 2021-08-04 ENCOUNTER — HOSPITAL ENCOUNTER (OUTPATIENT)
Age: 46
Discharge: HOME | End: 2021-08-04
Payer: COMMERCIAL

## 2021-08-04 DIAGNOSIS — E55.9 VITAMIN D INSUFFICIENCY: ICD-10-CM

## 2021-08-04 DIAGNOSIS — Z11.59 NEED FOR HEPATITIS C SCREENING TEST: ICD-10-CM

## 2021-08-04 DIAGNOSIS — E06.3 HASHIMOTO'S THYROIDITIS: ICD-10-CM

## 2021-08-04 DIAGNOSIS — R68.89 COLD INTOLERANCE: ICD-10-CM

## 2021-08-04 DIAGNOSIS — R53.83 FATIGUE, UNSPECIFIED TYPE: ICD-10-CM

## 2021-08-04 LAB
CALCIUM SERPL-MCNC: 8.6 MG/DL (ref 8.6–10.4)
HEPATITIS C ANTIBODY: NONREACTIVE
POTASSIUM SERPL-SCNC: 4.1 MMOL/L (ref 3.7–5.3)
T3 FREE: 2.12 PG/ML (ref 2.02–4.43)
THYROXINE, FREE: 1.39 NG/DL (ref 0.93–1.7)
TSH SERPL DL<=0.05 MIU/L-ACNC: 3.36 MIU/L (ref 0.3–5)
VITAMIN D 25-HYDROXY: 27.4 NG/ML (ref 30–100)

## 2021-08-04 PROCEDURE — 86803 HEPATITIS C AB TEST: CPT

## 2021-08-04 PROCEDURE — 84443 ASSAY THYROID STIM HORMONE: CPT

## 2021-08-04 PROCEDURE — 84481 FREE ASSAY (FT-3): CPT

## 2021-08-04 PROCEDURE — 82306 VITAMIN D 25 HYDROXY: CPT

## 2021-08-04 PROCEDURE — 82310 ASSAY OF CALCIUM: CPT

## 2021-08-04 PROCEDURE — 36415 COLL VENOUS BLD VENIPUNCTURE: CPT

## 2021-08-04 PROCEDURE — 84132 ASSAY OF SERUM POTASSIUM: CPT

## 2021-08-04 PROCEDURE — 84439 ASSAY OF FREE THYROXINE: CPT

## 2021-08-05 ENCOUNTER — PATIENT MESSAGE (OUTPATIENT)
Dept: FAMILY MEDICINE CLINIC | Age: 46
End: 2021-08-05

## 2021-08-05 DIAGNOSIS — E55.9 VITAMIN D INSUFFICIENCY: Primary | ICD-10-CM

## 2021-08-05 RX ORDER — ERGOCALCIFEROL 1.25 MG/1
CAPSULE ORAL
Qty: 4 CAPSULE | Refills: 0 | Status: SHIPPED | OUTPATIENT
Start: 2021-08-05 | End: 2021-08-05 | Stop reason: SDUPTHER

## 2021-08-05 RX ORDER — ERGOCALCIFEROL 1.25 MG/1
CAPSULE ORAL
Qty: 4 CAPSULE | Refills: 0 | Status: SHIPPED | OUTPATIENT
Start: 2021-08-05 | End: 2021-08-24 | Stop reason: SDUPTHER

## 2021-08-05 NOTE — TELEPHONE ENCOUNTER
From: Pedro Gil  To: Vira Coker, APRN - CNP  Sent: 8/5/2021 9:01 AM EDT  Subject: Test Results Question    Sorry for the 2nd email. The system wouldn't let me send a longer note. My Vit D is still low. It was normal when I was taking the VD scrip you were calling in. However, it ran out. Do I need to go back on Vit D? Lastly, [facility] told me to start taking Iron supplements. I hear that they cause constipation which is still an issue. Are there any alternatives that would not impact my bowels? If we need an appointment to review all of of this, please let me know. I am good with that.      Thank you    [place]

## 2021-08-15 DIAGNOSIS — E06.3 HASHIMOTO'S THYROIDITIS: ICD-10-CM

## 2021-08-15 DIAGNOSIS — R53.83 FATIGUE, UNSPECIFIED TYPE: ICD-10-CM

## 2021-08-15 DIAGNOSIS — R68.89 COLD INTOLERANCE: ICD-10-CM

## 2021-08-15 RX ORDER — LEVOTHYROXINE SODIUM 175 UG/1
TABLET ORAL
Qty: 30 TABLET | Refills: 1 | Status: SHIPPED
Start: 2021-08-15 | End: 2021-10-04 | Stop reason: DRUGHIGH

## 2021-08-24 ENCOUNTER — TELEMEDICINE (OUTPATIENT)
Dept: FAMILY MEDICINE CLINIC | Age: 46
End: 2021-08-24
Payer: COMMERCIAL

## 2021-08-24 DIAGNOSIS — E06.3 HASHIMOTO'S THYROIDITIS: ICD-10-CM

## 2021-08-24 DIAGNOSIS — K86.1 CHRONIC PANCREATITIS, UNSPECIFIED PANCREATITIS TYPE (HCC): ICD-10-CM

## 2021-08-24 DIAGNOSIS — E55.9 VITAMIN D INSUFFICIENCY: Primary | ICD-10-CM

## 2021-08-24 PROCEDURE — 99213 OFFICE O/P EST LOW 20 MIN: CPT | Performed by: NURSE PRACTITIONER

## 2021-08-24 RX ORDER — ERGOCALCIFEROL 1.25 MG/1
CAPSULE ORAL
Qty: 12 CAPSULE | Refills: 0 | Status: SHIPPED | OUTPATIENT
Start: 2021-08-24 | End: 2022-07-02

## 2021-08-24 NOTE — PROGRESS NOTES
Visit Information    Have you changed or started any medications since your last visit including any over-the-counter medicines, vitamins, or herbal medicines? no   Have you stopped taking any of your medications? Is so, why? -  no  Are you having any side effects from any of your medications? - no    Have you seen any other physician or provider since your last visit?  no   Have you had any other diagnostic tests since your last visit?  no   Have you been seen in the emergency room and/or had an admission in a hospital since we last saw you?  no   Have you had your routine dental cleaning in the past 6 months?  no     Do you have an active MyChart account? If no, what is the barrier?   Yes    Patient Care Team:  TERENCE Huff CNP as PCP - General (Nurse Practitioner)  Will TERENCE Keene CNP as PCP - [facility] Provider    Medical History Review  Past Medical, Family, and Social History reviewed and  contribute to the patient presenting condition    Health Maintenance   Topic Date Due    Flu vaccine (1) 09/01/2021    TSH testing  08/04/2022    Lipid screen  10/30/2024    DTaP/Tdap/Td vaccine (3 - Td or Tdap) 08/09/2029    Colon cancer screen colonoscopy  04/28/2031    COVID-19 Vaccine  Completed    Hepatitis C screen  Completed    HIV screen  Addressed    Hepatitis A vaccine  Aged Out    Hepatitis B vaccine  Aged Out    Hib vaccine  Aged Out    Meningococcal (ACWY) vaccine  Aged Out    Pneumococcal 0-64 years Vaccine  Aged Out

## 2021-08-24 NOTE — PROGRESS NOTES
Emma Schumacher (:  1975) is a 51 y.o. male,Established patient, here for evaluation of the following chief complaint(s): Thyroid Problem         ASSESSMENT/PLAN:  1. Hashimoto's thyroiditis  Lab Results   Component Value Date    TSH 3.36 2021     Free t3/4 were also WNL  Will recheck labs in 1 month and see if any changes and continue same dose for now    -     TSH without Reflex; Future  -     T4, Free; Future  -     T3, Free; Future  2. Vitamin D insufficiency  New refill sent for 90 days    -     vitamin D (ERGOCALCIFEROL) 1.25 MG (65495 UT) CAPS capsule; TAKE ONE CAPSULE BY MOUTH ONCE WEEKLY, Disp-12 capsule, R-0Normal  3. Chronic pancreatitis, unspecified pancreatitis type ([address]  Continue with GI closely  Advised last liver enzymes were all WNL from -  Continue diet as tolerated and marinol for appetite    Return if symptoms worsen or fail to improve. SUBJECTIVE/OBJECTIVE:  HPI  Patient calling in today to discuss recent labs. As he is overall doing much better and has been able to incorporate more fat into his diet. He is about to start back to the workforce at a new job on Monday. He overall is feeling good and states that all of his medications are working well. He recently started vitamin D again and would like a refill for 90 days. He also questions his thyroid labs. He wonders due to his chronic pancreas and liver issues if he is breaking down T4 and T3 correctly. He states overall he is feeling good with no current symptoms of hypo or hyperthyroid.         Review of Systems    Patient-Reported Vitals 2021   Patient-Reported Weight 213   Patient-Reported Height 62   Patient-Reported Systolic 401   Patient-Reported Diastolic 73   Patient-Reported Pulse 71   Patient-Reported Temperature 97.6   Patient-Reported SpO2 98        Physical Exam    [INSTRUCTIONS:  \"[x]\" Indicates a positive item  \"[]\" Indicates a negative item  -- DELETE ALL ITEMS NOT EXAMINED]    Constitutional: note.    --Aneita Severin, TERENCE - CNP

## 2021-08-30 ENCOUNTER — PATIENT MESSAGE (OUTPATIENT)
Dept: FAMILY MEDICINE CLINIC | Age: 46
End: 2021-08-30

## 2021-08-30 NOTE — TELEPHONE ENCOUNTER
From: Kathrine Setting  To: Mark Soriano APRN - CNP  Sent: 8/30/2021 6:35 AM EDT  Subject: Visit Follow-Up Question    Good morning Honorio Prieto. Real quick, you should have a request for a refill on the Marinol. In our last call, I mentioned that I am having some issues again with intestinal motility and its creating some issues with pain and nausea. It's been getting a lot worse. I upped my milk of magnesia from 30 ml to 45ml every night and have added fiber. My stomach is also super painful. This is super new. I was thinking this through and realized that this all got worse when I started taking the Iron supplements that [facility] told me to take for the iron deficient anemia. I am supposed to be taking it for 3 months. I am about 3 or 4 weeks in. Is there another option to take iron in that won't cause such a problem with my insides?      thanks

## 2021-09-20 ENCOUNTER — HOSPITAL ENCOUNTER (OUTPATIENT)
Age: 46
Setting detail: SPECIMEN
Discharge: HOME | End: 2021-09-20
Payer: COMMERCIAL

## 2021-09-20 ENCOUNTER — PATIENT MESSAGE (OUTPATIENT)
Dept: FAMILY MEDICINE CLINIC | Age: 46
End: 2021-09-20

## 2021-09-20 ENCOUNTER — OFFICE VISIT (OUTPATIENT)
Dept: PRIMARY CARE CLINIC | Age: 46
End: 2021-09-20
Payer: COMMERCIAL

## 2021-09-20 VITALS
HEART RATE: 66 BPM | DIASTOLIC BLOOD PRESSURE: 84 MMHG | OXYGEN SATURATION: 97 % | SYSTOLIC BLOOD PRESSURE: 144 MMHG | TEMPERATURE: 98.4 F

## 2021-09-20 DIAGNOSIS — J02.0 ACUTE STREPTOCOCCAL PHARYNGITIS: Primary | ICD-10-CM

## 2021-09-20 DIAGNOSIS — J02.9 SORE THROAT: ICD-10-CM

## 2021-09-20 LAB — S PYO AG THROAT QL: POSITIVE

## 2021-09-20 PROCEDURE — 99213 OFFICE O/P EST LOW 20 MIN: CPT | Performed by: NURSE PRACTITIONER

## 2021-09-20 PROCEDURE — 87880 STREP A ASSAY W/OPTIC: CPT | Performed by: NURSE PRACTITIONER

## 2021-09-20 RX ORDER — LANOLIN ALCOHOL/MO/W.PET/CERES
325 CREAM (GRAM) TOPICAL DAILY
COMMUNITY

## 2021-09-20 RX ORDER — AZITHROMYCIN 250 MG/1
TABLET, FILM COATED ORAL
Qty: 1 PACKET | Refills: 0 | Status: SHIPPED | OUTPATIENT
Start: 2021-09-20 | End: 2021-10-06 | Stop reason: ALTCHOICE

## 2021-09-20 ASSESSMENT — ENCOUNTER SYMPTOMS
VOICE CHANGE: 0
SORE THROAT: 1
SHORTNESS OF BREATH: 0
SINUS PRESSURE: 1
COUGH: 1
EYE REDNESS: 0
EYE DISCHARGE: 0
WHEEZING: 0
NAUSEA: 1
CHEST TIGHTNESS: 0

## 2021-09-20 NOTE — PATIENT INSTRUCTIONS

## 2021-09-20 NOTE — PROGRESS NOTES
[id number]  [id number] [address]  Dept: [phone]  Dept Fax: [phone]     Yvette Booth is a 51 y.o. male who presents to the urgent care today for his medicalconditions/complaints as noted below. Yvette Booth is c/o of Pharyngitis (ha, head congestion, cough intermittently, and low-grade fever yesterday -started on Thursday - had a rapid covid test at urgent care in [facility] yesterday and tested neg)    HPI:      Pharyngitis  This is a new problem. Episode onset: Thursday. The problem occurs constantly. The problem has been waxing and waning. Associated symptoms include congestion, coughing, a fever, headaches, nausea (chronic) and a sore throat. Pertinent negatives include no chest pain, chills, fatigue, myalgias, rash or weakness. The symptoms are aggravated by drinking, eating and swallowing. Treatments tried: advil. The treatment provided no relief. Negative rapid COVID yesterday.     Past Medical History:   Diagnosis Date    Anxiety     Back pain     Cellulitis     Central spinal stenosis     Cough     Diarrhea     Esophageal reflux     Facial trauma     age 14, diving injury    Fatigue     Headache(784.0)     Hypertension     Hypogonadism     Hypothyroidism     hx of hashimoto's    Knee pain     Palpitations       Current Outpatient Medications   Medication Sig Dispense Refill    ferrous sulfate (FE TABS 325) 325 (65 Fe) MG EC tablet Take 325 mg by mouth daily      azithromycin (ZITHROMAX Z-MARI) 250 MG tablet Take 2 tabs on day 1 followed by 1 tab on days 2-5. 1 packet 0    dronabinol (MARINOL) 2.5 MG capsule TAKE ONE BY MOUTH TWICE A DAY BEFORE A MEAL 60 capsule 1    vitamin D (ERGOCALCIFEROL) 1.25 MG (32258 UT) CAPS capsule TAKE ONE CAPSULE BY MOUTH ONCE WEEKLY 12 capsule 0    levothyroxine (SYNTHROID) 175 MCG tablet TAKE ONE TABLET BY MOUTH DAILY 30 tablet 1    cyclobenzaprine (FLEXERIL) 10 MG tablet TAKE ONE TABLET BY MOUTH THREE TIMES A DAY AS NEEDED FOR MUSCLE SPASMS 60 tablet 3    ondansetron (ZOFRAN-ODT) 4 MG disintegrating tablet Take 4 mg by mouth every 8 hours as needed      ibuprofen (ADVIL;MOTRIN) 600 MG tablet Take 1 tablet by mouth 4 times daily as needed for Pain 40 tablet 0     Current Facility-Administered Medications   Medication Dose Route Frequency Provider Last Rate Last Admin    albuterol (PROVENTIL) nebulizer solution 2.5 mg  2.5 mg Nebulization Q6H PRN Bernestine Ken, APRN - CNP         Allergies   Allergen Reactions    Levofloxacin Anaphylaxis, Hives, Other (See Comments), Rash and Shortness Of Breath    Amoxicillin Hives and Other (See Comments)     Mouth ulcers    Augmentin [Amoxicillin-Pot Clavulanate] Hives    Cefepime     Gatifloxacin      Hives    Other Hives and Other (See Comments)     suresh     Reviewed PMH, SH, and FH with the patient and updated. Subjective:      Review of Systems   Constitutional: Positive for fever. Negative for chills and fatigue. HENT: Positive for congestion, ear pain (plugged), sinus pressure and sore throat. Negative for ear discharge, postnasal drip, sneezing and voice change. Eyes: Negative for discharge and redness. Respiratory: Positive for cough. Negative for chest tightness, shortness of breath and wheezing. Cardiovascular: Negative. Negative for chest pain. Gastrointestinal: Positive for nausea (chronic). Musculoskeletal: Negative for myalgias. Skin: Negative for rash. Neurological: Positive for headaches. Negative for dizziness, weakness and light-headedness. Hematological: Negative for adenopathy. All other systems reviewed and are negative. Objective:      Physical Exam  Vitals and nursing note reviewed. Constitutional:       General: He is not in acute distress. Appearance: Normal appearance. He is well-developed. He is not ill-appearing, toxic-appearing or diaphoretic. HENT:      Head: Normocephalic.       Right Ear: Tympanic membrane and external ear normal.      Left Ear: Tympanic membrane and external ear normal.      Nose: Nose normal.      Right Sinus: No maxillary sinus tenderness or frontal sinus tenderness. Left Sinus: No maxillary sinus tenderness or frontal sinus tenderness. Mouth/Throat:      Pharynx: Posterior oropharyngeal erythema present. No oropharyngeal exudate. Eyes:      General:         Right eye: No discharge. Left eye: No discharge. Cardiovascular:      Rate and Rhythm: Normal rate and regular rhythm. Heart sounds: Normal heart sounds. No murmur heard. Pulmonary:      Effort: Pulmonary effort is normal. No respiratory distress. Breath sounds: Normal breath sounds. No wheezing or rales. Lymphadenopathy:      Cervical: Cervical adenopathy present. Skin:     General: Skin is warm. Findings: No rash. Neurological:      Mental Status: He is alert. BP (!) 144/84 (Site: Right Upper Arm, Position: Sitting, Cuff Size: Medium Adult)   Pulse 66   Temp 98.4 °F (36.9 °C) (Temporal)   SpO2 97%     + rapid strep    Assessment:       Diagnosis Orders   1. Acute streptococcal pharyngitis  azithromycin (ZITHROMAX Z-MARI) 250 MG tablet   2. Sore throat  POCT rapid strep A    COVID-19     Plan:      Patient instructed to complete entire antibiotic course. Tylenol/Motrin as needed for fever/discomfort. Change toothbrush in 24 hours. Salt water gargles and throat lozenges if desired. Will send out COVID19 testing. Possible treatment alterations based on the results. Patient instructed to self-quarantine until testing results are back. Patient instructed not to return to work until results are back. Tylenol as needed for fever/pain. Encouraged adequate hydration and rest.  The patient indicates understanding of these issues and agrees with the plan. Educational materials provided on AVS.  Follow up if symptoms do not improve/worsen.  Discussed symptoms that will warrant urgent ED evaluation/treatment. Orders Placed This Encounter   Medications    azithromycin (ZITHROMAX Z-MARI) 250 MG tablet     Sig: Take 2 tabs on day 1 followed by 1 tab on days 2-5. Dispense:  1 packet     Refill:  0        Patient given educational materials - see patient instructions. Discussed use, benefit, and side effects of prescribed medications. All patientquestions answered. Pt voiced understanding.     Electronically signed by TERENCE Montes CNP on 9/20/2021at 3:00 PM

## 2021-09-20 NOTE — TELEPHONE ENCOUNTER
From: Catrachita De Guzman  To: Ronan Aiken APRN - CNP  Sent: 9/20/2021 8:52 AM EDT  Subject: Visit Follow-Up Question    Good morning Catarina Dinning. Quick question. .. I was exposed to Covid over a week ago. I am vaccinated. On Thursday, I started to get congested. Over the weekend, my throat got raw, my ears and jaw hurt, I had a low grade fever, and a really bad headache. (With the exception of the fever, I still have all of these symptoms this morning). I went to [address] [place] [address] yesterday for a rapid covid test. It was negative. The NP said I had lots of drainage. They sent me home without a plan of care. I got home and looked at my paperwork. Its noted that I am Covid Symptomatic with an upper respiratory infection. What should I do and how do I kick this? Am I contagious? Do I need to come into your office?      Thank you

## 2021-09-21 DIAGNOSIS — J02.9 SORE THROAT: ICD-10-CM

## 2021-09-21 LAB
SARS-COV-2: NORMAL
SARS-COV-2: NOT DETECTED
SOURCE: NORMAL

## 2021-09-30 ENCOUNTER — PATIENT MESSAGE (OUTPATIENT)
Dept: FAMILY MEDICINE CLINIC | Age: 46
End: 2021-09-30

## 2021-09-30 DIAGNOSIS — D50.9 IRON DEFICIENCY ANEMIA, UNSPECIFIED IRON DEFICIENCY ANEMIA TYPE: ICD-10-CM

## 2021-09-30 DIAGNOSIS — R68.89 COLD INTOLERANCE: Primary | ICD-10-CM

## 2021-10-01 ENCOUNTER — HOSPITAL ENCOUNTER (OUTPATIENT)
Age: 46
Discharge: HOME | End: 2021-10-01
Payer: COMMERCIAL

## 2021-10-01 DIAGNOSIS — D50.9 IRON DEFICIENCY ANEMIA, UNSPECIFIED IRON DEFICIENCY ANEMIA TYPE: ICD-10-CM

## 2021-10-01 DIAGNOSIS — R68.89 COLD INTOLERANCE: ICD-10-CM

## 2021-10-01 LAB
ABSOLUTE EOS #: 0.09 K/UL (ref 0–0.44)
ABSOLUTE IMMATURE GRANULOCYTE: <0.03 K/UL (ref 0–0.3)
ABSOLUTE LYMPH #: 1.35 K/UL (ref 1.1–3.7)
ABSOLUTE MONO #: 0.33 K/UL (ref 0.1–1.2)
BASOPHILS # BLD: 0 % (ref 0–2)
BASOPHILS ABSOLUTE: <0.03 K/UL (ref 0–0.2)
DIFFERENTIAL TYPE: ABNORMAL
EOSINOPHILS RELATIVE PERCENT: 2 % (ref 1–4)
FERRITIN: 133 UG/L (ref 30–400)
HCT VFR BLD CALC: 39.7 % (ref 40.7–50.3)
HEMOGLOBIN: 13 G/DL (ref 13–17)
IMMATURE GRANULOCYTES: 0 %
IRON SATURATION: 19 % (ref 20–55)
IRON: 46 UG/DL (ref 59–158)
LYMPHOCYTES # BLD: 25 % (ref 24–43)
MCH RBC QN AUTO: 28.6 PG (ref 25.2–33.5)
MCHC RBC AUTO-ENTMCNC: 32.7 G/DL (ref 28.4–34.8)
MCV RBC AUTO: 87.4 FL (ref 82.6–102.9)
MONOCYTES # BLD: 6 % (ref 3–12)
NRBC AUTOMATED: 0 PER 100 WBC
PDW BLD-RTO: 12.1 % (ref 11.8–14.4)
PLATELET # BLD: 166 K/UL (ref 138–453)
PLATELET ESTIMATE: ABNORMAL
PMV BLD AUTO: 12 FL (ref 8.1–13.5)
RBC # BLD: 4.54 M/UL (ref 4.21–5.77)
RBC # BLD: ABNORMAL 10*6/UL
SEG NEUTROPHILS: 67 % (ref 36–65)
SEGMENTED NEUTROPHILS ABSOLUTE COUNT: 3.69 K/UL (ref 1.5–8.1)
TOTAL IRON BINDING CAPACITY: 248 UG/DL (ref 250–450)
UNSATURATED IRON BINDING CAPACITY: 202 UG/DL (ref 112–347)
WBC # BLD: 5.5 K/UL (ref 3.5–11.3)
WBC # BLD: ABNORMAL 10*3/UL

## 2021-10-01 PROCEDURE — 84481 FREE ASSAY (FT-3): CPT

## 2021-10-01 PROCEDURE — 36415 COLL VENOUS BLD VENIPUNCTURE: CPT

## 2021-10-01 PROCEDURE — 82728 ASSAY OF FERRITIN: CPT

## 2021-10-01 PROCEDURE — 83540 ASSAY OF IRON: CPT

## 2021-10-01 PROCEDURE — 84443 ASSAY THYROID STIM HORMONE: CPT

## 2021-10-01 PROCEDURE — 85025 COMPLETE CBC W/AUTO DIFF WBC: CPT

## 2021-10-01 PROCEDURE — 83550 IRON BINDING TEST: CPT

## 2021-10-01 PROCEDURE — 84439 ASSAY OF FREE THYROXINE: CPT

## 2021-10-01 NOTE — TELEPHONE ENCOUNTER
From: Savannah Sumner  To: [place] Vas[place] APRN - CNP  Sent: 9/30/2021 5:21 PM EDT  Subject: Visit Follow-Up Question    Good evening [facility]. I hope all is well. Quick question, I am getting my thyroid labs done tomorrow, a month out from our last appt, as discussed. I have been super super cold for a couple of weeks now. This past week, my temp really started sinking. I've been between 96.4-96.9. I have been taking iron for the anemia, as prescribed, for a couple of months now but stopped for a week due to the constipation it's causing. I hear that anemia can make you feel cold. Is it feasible to see about doing a re check on my iron levels (there are 3 or so parts of it, I don't remember)?      Thank you

## 2021-10-02 ENCOUNTER — HOSPITAL ENCOUNTER (OUTPATIENT)
Age: 46
Setting detail: SPECIMEN
Discharge: HOME | End: 2021-10-02
Payer: COMMERCIAL

## 2021-10-02 DIAGNOSIS — E06.3 HASHIMOTO'S THYROIDITIS: ICD-10-CM

## 2021-10-02 LAB
T3 FREE: 2.36 PG/ML (ref 2.02–4.43)
THYROXINE, FREE: 1.65 NG/DL (ref 0.93–1.7)
TSH SERPL DL<=0.05 MIU/L-ACNC: 5.66 MIU/L (ref 0.3–5)

## 2021-10-04 DIAGNOSIS — E06.3 HASHIMOTO'S THYROIDITIS: Primary | ICD-10-CM

## 2021-10-04 RX ORDER — LEVOTHYROXINE SODIUM 0.2 MG/1
200 TABLET ORAL DAILY
Qty: 30 TABLET | Refills: 2 | Status: SHIPPED | OUTPATIENT
Start: 2021-10-04 | End: 2021-12-28 | Stop reason: SDUPTHER

## 2021-10-06 ENCOUNTER — HOSPITAL ENCOUNTER (OUTPATIENT)
Age: 46
Setting detail: SPECIMEN
Discharge: HOME | End: 2021-10-06
Payer: COMMERCIAL

## 2021-10-06 ENCOUNTER — OFFICE VISIT (OUTPATIENT)
Dept: PRIMARY CARE CLINIC | Age: 46
End: 2021-10-06
Payer: COMMERCIAL

## 2021-10-06 VITALS
WEIGHT: 239 LBS | TEMPERATURE: 98.2 F | HEIGHT: 74 IN | SYSTOLIC BLOOD PRESSURE: 132 MMHG | HEART RATE: 69 BPM | DIASTOLIC BLOOD PRESSURE: 81 MMHG | BODY MASS INDEX: 30.67 KG/M2

## 2021-10-06 DIAGNOSIS — Z20.822 CONTACT WITH AND (SUSPECTED) EXPOSURE TO COVID-19: Primary | ICD-10-CM

## 2021-10-06 DIAGNOSIS — J02.9 SORE THROAT: ICD-10-CM

## 2021-10-06 LAB — S PYO AG THROAT QL: NORMAL

## 2021-10-06 PROCEDURE — 87880 STREP A ASSAY W/OPTIC: CPT

## 2021-10-06 PROCEDURE — 99214 OFFICE O/P EST MOD 30 MIN: CPT

## 2021-10-06 RX ORDER — DRONABINOL 2.5 MG/1
CAPSULE ORAL
COMMUNITY
Start: 2021-10-01 | End: 2021-11-29

## 2021-10-06 RX ORDER — PANCRELIPASE 36000; 180000; 114000 [USP'U]/1; [USP'U]/1; [USP'U]/1
CAPSULE, DELAYED RELEASE PELLETS ORAL
COMMUNITY
Start: 2021-09-30

## 2021-10-06 ASSESSMENT — ENCOUNTER SYMPTOMS
GASTROINTESTINAL NEGATIVE: 1
EYE DISCHARGE: 0
NAUSEA: 0
WHEEZING: 0
EYES NEGATIVE: 1
SORE THROAT: 1
VOMITING: 0
COUGH: 1
ABDOMINAL PAIN: 0
SHORTNESS OF BREATH: 0
EYE PAIN: 0
DIARRHEA: 0
SINUS PRESSURE: 0
SINUS PAIN: 0
CHEST TIGHTNESS: 0
SWOLLEN GLANDS: 0
RHINORRHEA: 0
EYE ITCHING: 0

## 2021-10-06 NOTE — PATIENT INSTRUCTIONS
Patient Education        Learning About Coronavirus [phone]  What is coronavirus (COVID-19)? COVID-19 is a disease caused by a type of coronavirus. This illness was first found in December 2019. It has since spread worldwide. Coronaviruses are a large group of viruses. They cause the common cold. They also cause more serious illnesses like [place] respiratory syndrome (MERS) and severe acute respiratory syndrome (SARS). COVID-19 is caused by a novel coronavirus. That means it's a new type that has not been seen in people before. What are the symptoms? COVID-19 symptoms may include:  · Fever. · Cough. · Trouble breathing. · Chills or repeated shaking with chills. · Muscle and body aches. · Headache. · Sore throat. · New loss of taste or smell. · Vomiting. · Diarrhea. In severe cases, COVID-19 can cause pneumonia and make it hard to breathe without help from a machine. It can cause death. How is it diagnosed? COVID-19 is diagnosed with a viral test. This may also be called a PCR test or antigen test. It looks for evidence of the virus in your breathing passages or lungs (respiratory system). The test is most often done on a sample from the nose, throat, or lungs. It's sometimes done on a sample of saliva. One way a sample is collected is by putting a long swab into the back of your nose. How is it treated? Mild cases of COVID-19 can be treated at home. Serious cases need treatment in the hospital. Treatment may include medicines to reduce symptoms, plus breathing support such as oxygen therapy or a ventilator. Some people may be placed on their belly to help their oxygen levels. Treatments that may help people who have COVID-19 include:  Antiviral medicines. These medicines treat viral infections. Remdesivir is an example. Immune-based therapy. These medicines help the immune system fight COVID-19. Examples include monoclonal antibodies. Blood thinners.    These medicines help prevent blood clots. People with severe illness are at risk for blood clots. How can you protect yourself and others? The best way to protect yourself from getting sick is to:  · Get vaccinated. · Avoid sick people. · If you are not fully vaccinated:  ? Wear a mask if you have to go to public areas. ? Avoid crowds and try to stay at least 6 feet away from other people. · Cover your mouth with a tissue when you cough or sneeze. · Wash your hands often, especially after you cough or sneeze. Use soap and water, and scrub for at least 20 seconds. If soap and water aren't available, use an alcohol-based hand . · Avoid touching your mouth, nose, and eyes. To help avoid spreading the virus to others:  · Get vaccinated. · Cover your mouth with a tissue when you cough or sneeze. · Wash your hands often, especially after you cough or sneeze. Use soap and water, and scrub for at least 20 seconds. If soap and water aren't available, use an alcohol-based hand . · If you have been exposed to the virus and are not fully vaccinated:  ? Stay home. Don't go to school, work, or public areas. And don't use public transportation, ride-shares, or taxis unless you have no choice. ? Wear a mask if you have to go to public areas, like the pharmacy. · If you're sick:  ? Leave your home only if you need to get medical care. But call the doctor's office first so they know you're coming. And wear a mask. ? Wear a mask whenever you're around other people. ? Limit contact with pets and people in your home. If possible, stay in a separate bedroom and use a separate bathroom. ? Clean and disinfect your home every day. Use household  and disinfectant wipes or sprays. Take special care to clean things that you touch with your hands. How can you self-isolate when you have COVID-19? If you have COVID-19, there are things you can do to help avoid spreading the virus to others.   · Limit contact with people in your home. If possible, stay in a separate bedroom and use a separate bathroom. · Wear a mask when you are around other people. · If you have to leave home, avoid crowds and try to stay at least 6 feet away from other people. · Avoid contact with pets and other animals. · Cover your mouth and nose with a tissue when you cough or sneeze. Then throw it in the trash right away. · Wash your hands often, especially after you cough or sneeze. Use soap and water, and scrub for at least 20 seconds. If soap and water aren't available, use an alcohol-based hand . · Don't share personal household items. These include bedding, towels, cups and glasses, and eating utensils. · [address] in the warmest water allowed for the fabric type, and dry it completely. It's okay to wash other people's laundry with yours. · Clean and disinfect your home. Use household  and disinfectant wipes or sprays. When should you call for help? Call 911 anytime you think you may need emergency care. For example, call if you have life-threatening symptoms, such as:    · You have severe trouble breathing. (You can't talk at all.)     · You have constant chest pain or pressure.     · You are severely dizzy or lightheaded.     · You are confused or can't think clearly.     · You have pale, gray, or blue-colored skin or lips.     · You pass out (lose consciousness) or are very hard to wake up. Call your doctor now or seek immediate medical care if:    · You have moderate trouble breathing. (You can't speak a full sentence.)     · You are coughing up blood (more than about 1 teaspoon).     · You have signs of low blood pressure. These include feeling lightheaded; being too weak to stand; and having cold, pale, clammy skin.    Watch closely for changes in your health, and be sure to contact your doctor if:    · Your symptoms get worse.     · You are not getting better as expected.     · You have new or worse symptoms of anxiety, depression, nightmares, or flashbacks. Call before you go to the doctor's office. Follow their instructions. And wear a mask. Current as of: July 1, 2021               Content Version: 13.0  © 3918-3016 [employer]. Care instructions adapted under license by [address] [place] [address]. If you have questions about a medical condition or this instruction, always ask your healthcare professional. [place] [address] any warranty or liability for your use of this information.

## 2021-10-06 NOTE — PROGRESS NOTES
cyclobenzaprine (FLEXERIL) 10 MG tablet TAKE ONE TABLET BY MOUTH THREE TIMES A DAY AS NEEDED FOR MUSCLE SPASMS 60 tablet 3    ondansetron (ZOFRAN-ODT) 4 MG disintegrating tablet Take 4 mg by mouth every 8 hours as needed      ibuprofen (ADVIL;MOTRIN) 600 MG tablet Take 1 tablet by mouth 4 times daily as needed for Pain 40 tablet 0     Current Facility-Administered Medications   Medication Dose Route Frequency Provider Last Rate Last Admin    albuterol (PROVENTIL) nebulizer solution 2.5 mg  2.5 mg Nebulization Q6H PRN TERENCE Bush - CNP           Allergies   Allergen Reactions    Levofloxacin Anaphylaxis, Hives, Other (See Comments), Rash and Shortness Of Breath    Amoxicillin Hives and Other (See Comments)     Mouth ulcers    Augmentin [Amoxicillin-Pot Clavulanate] Hives    Cefepime     Gatifloxacin      Hives    Other Hives and Other (See Comments)     tequin       Review of Systems:     Review of Systems   Constitutional: Negative. Negative for chills, fatigue and fever. HENT: Positive for congestion, ear pain and sore throat. Negative for rhinorrhea, sinus pressure, sinus pain and sneezing. Eyes: Negative. Negative for pain, discharge and itching. Respiratory: Positive for cough. Negative for chest tightness, shortness of breath and wheezing. Cardiovascular: Negative. Negative for chest pain, palpitations and leg swelling. Gastrointestinal: Negative. Negative for abdominal pain, diarrhea, nausea and vomiting. Skin: Negative. Negative for rash. Neurological: Negative. Negative for dizziness, weakness, numbness and headaches. Physical Exam:      /81 (Site: Left Upper Arm, Position: Sitting, Cuff Size: Medium Adult)   Pulse 69   Temp 98.2 °F (36.8 °C) (Oral)   Ht 6' 2.02\" (1.88 m)   Wt 239 lb (108.4 kg)   BMI 30.67 kg/m²     Physical Exam  Vitals reviewed. Constitutional:       Appearance: Normal appearance. He is normal weight.    HENT:      Head: Normocephalic. Right Ear: Tympanic membrane, ear canal and external ear normal.      Left Ear: Tympanic membrane, ear canal and external ear normal.      Nose: Congestion present. No rhinorrhea. Mouth/Throat:      Mouth: Mucous membranes are moist.      Pharynx: Oropharynx is clear. No oropharyngeal exudate or posterior oropharyngeal erythema. Eyes:      Conjunctiva/sclera: Conjunctivae normal.      Pupils: Pupils are equal, round, and reactive to light. Cardiovascular:      Rate and Rhythm: Normal rate and regular rhythm. Heart sounds: Normal heart sounds. Pulmonary:      Effort: Pulmonary effort is normal.      Breath sounds: Normal breath sounds. Abdominal:      General: Abdomen is flat. Bowel sounds are normal.      Palpations: Abdomen is soft. Musculoskeletal:      Cervical back: Normal range of motion and neck supple. Skin:     General: Skin is warm and dry. Capillary Refill: Capillary refill takes less than 2 seconds. Neurological:      General: No focal deficit present. Mental Status: He is alert and oriented to person, place, and time. Mental status is at baseline. Plan:          1. Contact with and (suspected) exposure to covid-19  -     COVID-19  2. Sore throat  -     POCT rapid strep A     Results for POC orders placed in visit on 10/06/21   POCT rapid strep A   Result Value Ref Range    Strep A Ag None Detected None Detected         Follow Up Instructions:      Return if symptoms worsen or fail to improve. No orders of the defined types were placed in this encounter. Will send out COVID19 testing. Possible treatment alterations based on the results. Patient instructed to self-quarantine until testing results are back. Patient instructed not to return to work until results are back. Tylenol as needed for fever/pain.   Encouraged adequate hydration and rest.  The patient indicates understanding of these issues and agrees with the plan.  Educational materials provided on AVS.  Follow up if symptoms do not improve/worsen. Discussed symptoms that will warrant urgent ED evaluation/treatment. Patient and/or parent given educational materials - see patient instructions. Discussed use, benefit, and side effects of prescribed medications. All patient questions answered. Patient and/or parent voiced understanding.       Electronically signed by TERENCE Ruvalcaba 10/6/2021 at 11:33 AM

## 2021-10-07 ENCOUNTER — PATIENT MESSAGE (OUTPATIENT)
Dept: FAMILY MEDICINE CLINIC | Age: 46
End: 2021-10-07

## 2021-10-07 LAB
SARS-COV-2: NORMAL
SARS-COV-2: NOT DETECTED
SOURCE: NORMAL

## 2021-10-07 RX ORDER — DOXYCYCLINE HYCLATE 100 MG
100 TABLET ORAL 2 TIMES DAILY
Qty: 14 TABLET | Refills: 0 | Status: SHIPPED | OUTPATIENT
Start: 2021-10-07 | End: 2021-10-14

## 2021-10-07 NOTE — TELEPHONE ENCOUNTER
From: Jayda Stark  To: TERENCE Brown - SAJAN  Sent: 10/7/2021 1:47 PM EDT  Subject: Visit Follow-Up Question    Mark Reynoso. I hope all is well. Quick question, I am still dealing with the same symptoms that brought me to urgent care 2 weeks ago namely a sore throat, ears are congested, exhaustion, and an occasional mild fever. 2 weeks ago, I tested positive for strep. [facility] has Covid, so I went to urgent care yesterday and got tested. I was negative for both Covid and strep. What should be my next steps to kick this?      Thank you

## 2021-10-08 ENCOUNTER — HOSPITAL ENCOUNTER (OUTPATIENT)
Age: 46
Setting detail: SPECIMEN
Discharge: HOME | End: 2021-10-08
Payer: COMMERCIAL

## 2021-10-08 ENCOUNTER — OFFICE VISIT (OUTPATIENT)
Dept: PRIMARY CARE CLINIC | Age: 46
End: 2021-10-08
Payer: COMMERCIAL

## 2021-10-08 VITALS
HEART RATE: 60 BPM | SYSTOLIC BLOOD PRESSURE: 134 MMHG | TEMPERATURE: 98.3 F | OXYGEN SATURATION: 99 % | DIASTOLIC BLOOD PRESSURE: 83 MMHG

## 2021-10-08 DIAGNOSIS — Z20.822 CONTACT WITH AND (SUSPECTED) EXPOSURE TO COVID-19: Primary | ICD-10-CM

## 2021-10-08 PROCEDURE — 99214 OFFICE O/P EST MOD 30 MIN: CPT

## 2021-10-08 NOTE — PROGRESS NOTES
[facility] [address] [place] [facility]  [address] [facility] [address]    [facility] [address] [facility]  [id number] [address]  Dept: [phone]    Taina Leung is a 51 y.o. male Established patient, who presents to the walk-in clinic today with conditions/complaints as noted below:    Chief Complaint   Patient presents with    Pharyngitis     mcguire - was tested for strep on Wednesday and it was negative - needs covid test for work         HPI:     Pharyngitis  This is a new problem. The current episode started in the past 7 days. The problem occurs constantly. The problem has been unchanged. Associated symptoms include a sore throat. Pertinent negatives include no abdominal pain, chest pain, chills, congestion, coughing, fatigue, fever, headaches, nausea, numbness, rash, vomiting or weakness. The symptoms are aggravated by swallowing. He has tried NSAIDs and acetaminophen for the symptoms. The treatment provided no relief.        Past Medical History:   Diagnosis Date    Anxiety     Back pain     Cellulitis     Central spinal stenosis     Cough     Diarrhea     Esophageal reflux     Facial trauma     age 14, diving injury    Fatigue     Headache(784.0)     Hypertension     Hypogonadism     Hypothyroidism     hx of hashimoto's    Knee pain     Palpitations        Current Outpatient Medications   Medication Sig Dispense Refill    doxycycline hyclate (VIBRA-TABS) 100 MG tablet Take 1 tablet by mouth 2 times daily for 7 days 14 tablet 0    CREON 23541-945562 units CPEP delayed release capsule       dronabinol (MARINOL) 2.5 MG capsule       levothyroxine (SYNTHROID) 200 MCG tablet Take 1 tablet by mouth daily 30 tablet 2    ferrous sulfate (FE TABS 325) 325 (65 Fe) MG EC tablet Take 325 mg by mouth daily      vitamin D (ERGOCALCIFEROL) 1.25 MG (13651 UT) CAPS capsule TAKE ONE CAPSULE BY MOUTH ONCE WEEKLY 12 capsule 0    cyclobenzaprine (FLEXERIL) 10 MG tablet TAKE ONE TABLET BY MOUTH THREE TIMES A DAY AS NEEDED FOR MUSCLE SPASMS 60 tablet 3    ondansetron (ZOFRAN-ODT) 4 MG disintegrating tablet Take 4 mg by mouth every 8 hours as needed      ibuprofen (ADVIL;MOTRIN) 600 MG tablet Take 1 tablet by mouth 4 times daily as needed for Pain 40 tablet 0     Current Facility-Administered Medications   Medication Dose Route Frequency Provider Last Rate Last Admin    albuterol (PROVENTIL) nebulizer solution 2.5 mg  2.5 mg Nebulization Q6H PRN TERENCE Escobedo - SAJAN           Allergies   Allergen Reactions    Levofloxacin Anaphylaxis, Hives, Other (See Comments), Rash and Shortness Of Breath    Amoxicillin Hives and Other (See Comments)     Mouth ulcers    Augmentin [Amoxicillin-Pot Clavulanate] Hives    Cefepime     Gatifloxacin      Hives    Other Hives and Other (See Comments)     tequin       Review of Systems:     Review of Systems   Constitutional: Negative. Negative for chills, fatigue and fever. HENT: Positive for sore throat. Negative for congestion, ear pain, rhinorrhea, sinus pressure, sinus pain and sneezing. Eyes: Negative. Negative for pain, discharge and itching. Respiratory: Negative. Negative for cough, chest tightness, shortness of breath and wheezing. Cardiovascular: Negative. Negative for chest pain, palpitations and leg swelling. Gastrointestinal: Negative. Negative for abdominal pain, diarrhea, nausea and vomiting. Skin: Negative. Negative for rash. Neurological: Negative. Negative for dizziness, weakness, numbness and headaches. Physical Exam:      /83 (Site: Left Upper Arm, Position: Sitting, Cuff Size: Medium Adult)   Pulse 60   Temp 98.3 °F (36.8 °C) (Temporal)   SpO2 99%     Physical Exam  Vitals reviewed. Constitutional:       Appearance: Normal appearance. He is normal weight. HENT:      Head: Normocephalic.       Right Ear: Tympanic membrane, ear canal and external ear normal.      Left Ear: Tympanic membrane, ear canal and external ear normal.      Nose: Congestion present. No rhinorrhea. Mouth/Throat:      Mouth: Mucous membranes are moist.      Pharynx: Oropharynx is clear. Uvula midline. No pharyngeal swelling, oropharyngeal exudate, posterior oropharyngeal erythema or uvula swelling. Eyes:      Conjunctiva/sclera: Conjunctivae normal.      Pupils: Pupils are equal, round, and reactive to light. Cardiovascular:      Rate and Rhythm: Normal rate and regular rhythm. Heart sounds: Normal heart sounds. Pulmonary:      Effort: Pulmonary effort is normal.      Breath sounds: Normal breath sounds. Abdominal:      General: Abdomen is flat. Bowel sounds are normal.      Palpations: Abdomen is soft. Musculoskeletal:      Cervical back: Normal range of motion and neck supple. Skin:     General: Skin is warm and dry. Capillary Refill: Capillary refill takes less than 2 seconds. Neurological:      General: No focal deficit present. Mental Status: He is alert and oriented to person, place, and time. Mental status is at baseline. Plan:          1. Contact with and (suspected) exposure to covid-19  -     COVID-19   Already had Negative strep this last week. I discussed trying a steroid course for his sore throat and he declined. Follow Up Instructions:      Return if symptoms worsen or fail to improve. No orders of the defined types were placed in this encounter. Will send out COVID19 testing. Possible treatment alterations based on the results. Patient instructed to self-quarantine until testing results are back. Patient instructed not to return to work until results are back. Tylenol as needed for fever/pain. Encouraged adequate hydration and rest.  The patient indicates understanding of these issues and agrees with the plan. Educational materials provided on AVS.  Follow up if symptoms do not improve/worsen. Discussed symptoms that will warrant urgent ED evaluation/treatment. Patient and/or parent given educational materials - see patient instructions. Discussed use, benefit, and side effects of prescribed medications. All patient questions answered. Patient and/or parent voiced understanding.       Electronically signed by TERENCE Duong 10/10/2021 at 1:31 AM

## 2021-10-09 LAB
SARS-COV-2: NORMAL
SARS-COV-2: NOT DETECTED
SOURCE: NORMAL

## 2021-10-10 ASSESSMENT — ENCOUNTER SYMPTOMS
RESPIRATORY NEGATIVE: 1
SINUS PAIN: 0
SHORTNESS OF BREATH: 0
NAUSEA: 0
ABDOMINAL PAIN: 0
COUGH: 0
SORE THROAT: 1
CHEST TIGHTNESS: 0
EYE ITCHING: 0
VOMITING: 0
RHINORRHEA: 0
EYE PAIN: 0
EYE DISCHARGE: 0
SINUS PRESSURE: 0
GASTROINTESTINAL NEGATIVE: 1
EYES NEGATIVE: 1
DIARRHEA: 0
WHEEZING: 0

## 2021-10-26 ENCOUNTER — TELEPHONE (OUTPATIENT)
Dept: FAMILY MEDICINE CLINIC | Age: 46
End: 2021-10-26

## 2021-10-26 NOTE — TELEPHONE ENCOUNTER
----- Message from [address] [facility] sent at 10/25/2021 11:05 AM EDT -----  Subject: Hospital Follow Up    QUESTIONS  What hospital was the Patient Discharged from? [facility]  Date of Discharge? 2021-10-24  Discharge Location? Home  Reason for hospitalization as patient stated? Pancreatitis  What question does the patient have, if applicable?   ---------------------------------------------------------------------------  --------------  CALL BACK INFO  What is the best way for the office to contact you? OK to leave message on   voicemail  Preferred Call Back Phone Number? [phone]  ---------------------------------------------------------------------------  --------------  SCRIPT ANSWERS  Relationship to Patient?  Self

## 2021-10-27 ENCOUNTER — OFFICE VISIT (OUTPATIENT)
Dept: FAMILY MEDICINE CLINIC | Age: 46
End: 2021-10-27
Payer: COMMERCIAL

## 2021-10-27 VITALS
SYSTOLIC BLOOD PRESSURE: 110 MMHG | OXYGEN SATURATION: 97 % | WEIGHT: 216 LBS | HEIGHT: 74 IN | DIASTOLIC BLOOD PRESSURE: 68 MMHG | BODY MASS INDEX: 27.72 KG/M2 | TEMPERATURE: 97.6 F | HEART RATE: 72 BPM

## 2021-10-27 DIAGNOSIS — K85.00 IDIOPATHIC ACUTE PANCREATITIS WITHOUT INFECTION OR NECROSIS: Primary | ICD-10-CM

## 2021-10-27 PROCEDURE — 99213 OFFICE O/P EST LOW 20 MIN: CPT | Performed by: NURSE PRACTITIONER

## 2021-10-27 PROCEDURE — 1111F DSCHRG MED/CURRENT MED MERGE: CPT | Performed by: NURSE PRACTITIONER

## 2021-10-27 RX ORDER — HYDROCODONE BITARTRATE AND ACETAMINOPHEN 5; 325 MG/1; MG/1
1 TABLET ORAL EVERY 6 HOURS PRN
Qty: 12 TABLET | Refills: 0 | Status: SHIPPED | OUTPATIENT
Start: 2021-10-27 | End: 2021-10-30

## 2021-10-27 ASSESSMENT — ENCOUNTER SYMPTOMS
BLOOD IN STOOL: 0
ABDOMINAL DISTENTION: 1
NAUSEA: 1
CONSTIPATION: 1
ABDOMINAL PAIN: 1
SHORTNESS OF BREATH: 0
VOMITING: 0
CHEST TIGHTNESS: 0
DIARRHEA: 0
COUGH: 0

## 2021-10-27 NOTE — PROGRESS NOTES
Visit Information    Have you changed or started any medications since your last visit including any over-the-counter medicines, vitamins, or herbal medicines? no   Have you stopped taking any of your medications? Is so, why? -  no  Are you having any side effects from any of your medications? - no    Have you seen any other physician or provider since your last visit?  no   Have you had any other diagnostic tests since your last visit?  no   Have you been seen in the emergency room and/or had an admission in a hospital since we last saw you?  no   Have you had your routine dental cleaning in the past 6 months?  no     Do you have an active MyChart account? If no, what is the barrier?   Yes    Patient Care Team:  TERENCE Cabral CNP as PCP - General (Nurse Practitioner)  TERENCE Cabral CNP as PCP - [facility]    Medical History Review  Past Medical, Family, and Social History reviewed and  contribute to the patient presenting condition    Health Maintenance   Topic Date Due    Flu vaccine (1) 09/01/2021    TSH testing  10/01/2022    Lipid screen  10/30/2024    DTaP/Tdap/Td vaccine (3 - Td or Tdap) 08/09/2029    Colon cancer screen colonoscopy  04/28/2031    COVID-19 Vaccine  Completed    Hepatitis C screen  Completed    HIV screen  Addressed    Hepatitis A vaccine  Aged Out    Hepatitis B vaccine  Aged Out    Hib vaccine  Aged Out    Meningococcal (ACWY) vaccine  Aged Out    Pneumococcal 0-64 years Vaccine  Aged Out

## 2021-10-27 NOTE — PROGRESS NOTES
[address] [facility] [address] [place]  [phone]      Carmen Mari is a 51 y.o. male who presents today for his  medicalconditions/complaints as noted below. Carmen Mari is c/o of Follow-Up from [facility] ([facility] 10/22) and Pancreatitis (had attack,slowly feeling better,not able to eat completely due to nausea,stomach pain)  . HPI:    HPI  Here today for admission to [facility] hospital follow-up. States he was admitted on 10/22/2021 for acute pancreatitis that he had been suffering with for at least a week prior to going to the hospital.  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 States he had been doing well overall on his current medication regimen and had a good appetite and this acute flareup developed when he was down in [place] traveling for his new job. States he had not eaten anything out of the ordinary and does not drink alcohol. He was admitted for IV fluids and pain medication and rest and is about 20% better from when he left the hospital.  He was only given 6 [place] when he was discharged from the hospital and would like a few more if possible to get through this flareup. He does not have a follow-up appointment with his [facility] gastroenterologist until December 8, 2021. He is working really hard on his diet and trying to stick around 1300 wes a day. He states the Marinol was working well at its current dose before of his current flareup but did not work well during the [address].   Past Medical History:   Diagnosis Date    Anxiety     Back pain     Cellulitis     Central spinal stenosis     Cough     Diarrhea     Esophageal reflux     Facial trauma     age 14, diving injury    Fatigue     Headache(784.0)     Hypertension     Hypogonadism     Hypothyroidism     hx of hashimoto's    Knee pain     Palpitations       Past Surgical History:   Procedure Laterality Date    BACK SURGERY      is alert and oriented to person, place, and time. Mental status is at baseline. Psychiatric:         Mood and Affect: Mood normal.         Behavior: Behavior normal.         Thought Content: Thought content normal.         Judgment: Judgment normal.         Assessment:       Diagnosis Orders   1. Idiopathic acute pancreatitis without infection or necrosis  HYDROcodone-acetaminophen (NORCO) 5-325 MG per tablet     Reviewed labs and CT from ER visit  Wt Readings from Last 3 Encounters:   10/27/21 216 lb (98 kg)   10/06/21 239 lb (108.4 kg)   11/18/20 239 lb 3.2 oz (108.5 kg)       Plan:      Return if symptoms worsen or fail to improve. Orders Placed This Encounter   Medications    HYDROcodone-acetaminophen (NORCO) 5-325 MG per tablet     Sig: Take 1 tablet by mouth every 6 hours as needed for Pain for up to 3 days. Intended supply: 3 days. Take lowest dose possible to manage pain     Dispense:  12 tablet     Refill:  0     Reduce doses taken as pain becomes manageable dr Martinez Flair [phone]     New with plan to see GI and he also communicates with them regularly through his [employer] portal at [facility]  Refill [place] for short-term use and only as needed for abdominal pain  Continue a strict diet advised by [facility] for now also and continue other same medications  Call if any other needs  Patient given educational materials - see patient instructions. Discussed use,benefit, and side effects of prescribed medications. All patient questions answered. Pt voiced understanding. Reviewed health maintenance. Instructed to continue currentmedications, diet and exercise.     Electronically signed by TERENCE Titus CNP, CNP on 10/27/2021 at 12:02 PM

## 2021-11-11 ENCOUNTER — HOSPITAL ENCOUNTER (OUTPATIENT)
Age: 46
Discharge: HOME | End: 2021-11-11
Payer: COMMERCIAL

## 2021-11-11 DIAGNOSIS — E06.3 HASHIMOTO'S THYROIDITIS: ICD-10-CM

## 2021-11-11 LAB
THYROXINE, FREE: 1.65 NG/DL (ref 0.93–1.7)
TSH SERPL DL<=0.05 MIU/L-ACNC: 0.57 MIU/L (ref 0.3–5)

## 2021-11-11 PROCEDURE — 84443 ASSAY THYROID STIM HORMONE: CPT

## 2021-11-11 PROCEDURE — 36415 COLL VENOUS BLD VENIPUNCTURE: CPT

## 2021-11-11 PROCEDURE — 84439 ASSAY OF FREE THYROXINE: CPT

## 2021-11-12 ENCOUNTER — TELEPHONE (OUTPATIENT)
Dept: FAMILY MEDICINE CLINIC | Age: 46
End: 2021-11-12

## 2021-11-12 NOTE — TELEPHONE ENCOUNTER
Patient was advised on thyroid lab results. Asking if you wanted to check his T3 due to him not having a thyroid?  Please advise

## 2021-12-27 ENCOUNTER — PATIENT MESSAGE (OUTPATIENT)
Dept: FAMILY MEDICINE CLINIC | Age: 46
End: 2021-12-27

## 2021-12-27 DIAGNOSIS — E06.3 HASHIMOTO'S THYROIDITIS: Primary | ICD-10-CM

## 2021-12-27 NOTE — TELEPHONE ENCOUNTER
From: Rico Jerry  To: Elia Columbia  Sent: 12/27/2021 3:25 PM EST  Subject: Synthroid    Tushar Rodgers! Anitha Course! Real quick, I need a refill on my Synthroid. However, if this is like last time, I assume youll want me to check my thyroid labs. Is it possible to get orders for my TSH, T4, and T3? If they are normal, can I please have a 90-refill of the 200 mcg? Thank you Kamala Rodgers!

## 2021-12-28 ENCOUNTER — HOSPITAL ENCOUNTER (OUTPATIENT)
Age: 46
Discharge: HOME | End: 2021-12-28
Payer: COMMERCIAL

## 2021-12-28 DIAGNOSIS — E06.3 HASHIMOTO'S THYROIDITIS: ICD-10-CM

## 2021-12-28 LAB
T3 FREE: 2.5 PG/ML (ref 2.02–4.43)
THYROXINE, FREE: 1.86 NG/DL (ref 0.93–1.7)
TSH SERPL DL<=0.05 MIU/L-ACNC: 0.33 MIU/L (ref 0.3–5)

## 2021-12-28 PROCEDURE — 84443 ASSAY THYROID STIM HORMONE: CPT

## 2021-12-28 PROCEDURE — 84481 FREE ASSAY (FT-3): CPT

## 2021-12-28 PROCEDURE — 36415 COLL VENOUS BLD VENIPUNCTURE: CPT

## 2021-12-28 PROCEDURE — 84439 ASSAY OF FREE THYROXINE: CPT

## 2021-12-28 RX ORDER — LEVOTHYROXINE SODIUM 0.2 MG/1
200 TABLET ORAL DAILY
Qty: 90 TABLET | Refills: 3 | Status: SHIPPED
Start: 2021-12-28 | End: 2022-04-12 | Stop reason: SDUPTHER

## 2021-12-30 ENCOUNTER — PATIENT MESSAGE (OUTPATIENT)
Dept: FAMILY MEDICINE CLINIC | Age: 46
End: 2021-12-30

## 2021-12-30 DIAGNOSIS — K85.00 IDIOPATHIC ACUTE PANCREATITIS WITHOUT INFECTION OR NECROSIS: Primary | ICD-10-CM

## 2021-12-30 DIAGNOSIS — K85.00 IDIOPATHIC ACUTE PANCREATITIS, UNSPECIFIED COMPLICATION STATUS: ICD-10-CM

## 2021-12-30 RX ORDER — OXYCODONE HYDROCHLORIDE AND ACETAMINOPHEN 5; 325 MG/1; MG/1
1 TABLET ORAL EVERY 6 HOURS PRN
Qty: 28 TABLET | Refills: 0 | Status: SHIPPED | OUTPATIENT
Start: 2021-12-30 | End: 2022-01-06

## 2021-12-30 NOTE — TELEPHONE ENCOUNTER
From: Steffen Ramesh  To: Sudeep Salas  Sent: 12/30/2021 11:27 AM EST  Subject: Gavin Cintron, sorry to be a pest.     Abbi Arnold been struggling for about 2 weeks now with pancreatitis pain, eating, and nausea. I pushed it around [place] but have mostly been following the brat diet. In spite of this, nausea spiked pretty hard yesterday. I went full liquid yesterday but tried to eat four crackers this am. Now, Jennifer got stabbing pain in my pancreas. Here is my plan. Please let me know if this works or if I need to adjust it. Im going to hunker down to ride this flare out. Jennifer got standing pancreatitis labs at [facility] and will run up to their lab draw office real quick for those labs to make sure Im not having an emergency. Im going to go to a clear liquid diet for the next couple of days. Can I have a scrip for pain control as I fight this? Pain is pretty constant at about a 4 and then spikes to between 6 and 8. If this gets worse, or doesnt resolve in the next 2 days, or if I get a fever, I will head to the hospital.     Does this sound ok? Thank you Mari Mccord.

## 2022-02-10 DIAGNOSIS — R11.0 NAUSEA: Primary | ICD-10-CM

## 2022-02-10 DIAGNOSIS — K86.1 CHRONIC PANCREATITIS, UNSPECIFIED PANCREATITIS TYPE (HCC): ICD-10-CM

## 2022-02-11 RX ORDER — DRONABINOL 2.5 MG/1
CAPSULE ORAL
Qty: 60 CAPSULE | Refills: 0 | Status: SHIPPED | OUTPATIENT
Start: 2022-02-11 | End: 2022-03-13

## 2022-02-18 ENCOUNTER — TELEMEDICINE (OUTPATIENT)
Dept: FAMILY MEDICINE CLINIC | Age: 47
End: 2022-02-18
Payer: COMMERCIAL

## 2022-02-18 DIAGNOSIS — K86.1 CHRONIC BILIARY PANCREATITIS (HCC): ICD-10-CM

## 2022-02-18 DIAGNOSIS — I78.1 SYMPTOMATIC SPIDER ANGIOMA: Primary | ICD-10-CM

## 2022-02-18 DIAGNOSIS — E44.1 MILD PROTEIN-CALORIE MALNUTRITION (HCC): ICD-10-CM

## 2022-02-18 PROCEDURE — 99213 OFFICE O/P EST LOW 20 MIN: CPT | Performed by: NURSE PRACTITIONER

## 2022-02-18 RX ORDER — ONDANSETRON 4 MG/1
4 TABLET, ORALLY DISINTEGRATING ORAL EVERY 8 HOURS PRN
Qty: 90 TABLET | Refills: 0 | Status: SHIPPED | OUTPATIENT
Start: 2022-02-18 | End: 2022-04-04

## 2022-02-18 ASSESSMENT — PATIENT HEALTH QUESTIONNAIRE - PHQ9
SUM OF ALL RESPONSES TO PHQ QUESTIONS 1-9: 0
SUM OF ALL RESPONSES TO PHQ9 QUESTIONS 1 & 2: 0
2. FEELING DOWN, DEPRESSED OR HOPELESS: 0
SUM OF ALL RESPONSES TO PHQ QUESTIONS 1-9: 0
4. FEELING TIRED OR HAVING LITTLE ENERGY: 0
9. THOUGHTS THAT YOU WOULD BE BETTER OFF DEAD, OR OF HURTING YOURSELF: 0
SUM OF ALL RESPONSES TO PHQ QUESTIONS 1-9: 0
3. TROUBLE FALLING OR STAYING ASLEEP: 0
1. LITTLE INTEREST OR PLEASURE IN DOING THINGS: 0
6. FEELING BAD ABOUT YOURSELF - OR THAT YOU ARE A FAILURE OR HAVE LET YOURSELF OR YOUR FAMILY DOWN: 0
5. POOR APPETITE OR OVEREATING: 0
7. TROUBLE CONCENTRATING ON THINGS, SUCH AS READING THE NEWSPAPER OR WATCHING TELEVISION: 0
10. IF YOU CHECKED OFF ANY PROBLEMS, HOW DIFFICULT HAVE THESE PROBLEMS MADE IT FOR YOU TO DO YOUR WORK, TAKE CARE OF THINGS AT HOME, OR GET ALONG WITH OTHER PEOPLE: 0
8. MOVING OR SPEAKING SO SLOWLY THAT OTHER PEOPLE COULD HAVE NOTICED. OR THE OPPOSITE, BEING SO FIGETY OR RESTLESS THAT YOU HAVE BEEN MOVING AROUND A LOT MORE THAN USUAL: 0
SUM OF ALL RESPONSES TO PHQ QUESTIONS 1-9: 0

## 2022-02-18 NOTE — PROGRESS NOTES
Sarah Landaverde (:  1975) is a Established patient, here for evaluation of the following:    Assessment & Plan   Below is the assessment and plan developed based on review of pertinent history, physical exam, labs, studies, and medications. 1. Symptomatic spider angioma  Advised these are likely from vascular compromise with congestion from pancreatitis and now gastric ulcers/stress  Advised to discuss with his GI Dr. Also for more evaluation  Liver stable on labs and imaging  Can f/u with vein specialists if wants tx for these    2. Chronic biliary pancreatitis ([address]  Continue with GI, pain mgmt and dietician  Continue to focus on nutrient dense foods when able and smoothies/vitamin water  BG 92 on labs last month  Work note given  3. Mild protein-calorie malnutrition ([address]  See above    Return if symptoms worsen or fail to improve. MPU EGD      Narrative  Performed by Darrion Ram [facility] Procedures Unit - LAB LOCATION CODE:  [facility]   Patient Name: Sarah Landaverde   Procedure Date: 2/10/2022 4:12 PM   MRN: [id number]   YOB: 1975   Admit Type: Outpatient   Age: 51   Gender: Male   Anesthesia Required Next Case: consider MAC or use Benadryl in    addition   Prep Required Next Case:   Modifier 22: N/A   Procedure:           Upper GI endoscopy   Indications:         Upper abdominal pain   Providers:           Zena Matt. Alfonzo Wiggins MD, Muna Bennett (Chaperone), Endoscopy                        Technician   Referring MD:        Carlos Wiggins MD   Medicines:           Monitored Anesthesia Care   Complications:       No immediate complications.    Procedure:           After obtaining informed consent, the                        endoscope was passed under direct vision.                        Throughout the procedure, the patient's                        blood pressure, pulse, and oxygen                        saturations were monitored continuously.                        The ZNU- Endoscope was introduced                        through the mouth, and advanced to the                        third part of duodenum. The upper GI                        endoscopy was accomplished without                        difficulty. The patient tolerated the                        procedure well. Findings:        The Z-line was regular and was found 40 cm from the        incisors.        The examined esophagus was normal.        Approximately six non-bleeding superficial gastric ulcers        with no stigmata of bleeding were found in the gastric        antrum. The largest lesion was 3 mm in largest dimension.        Biopsies were taken with a cold forceps for histology.        Estimated blood loss was minimal.        The examined duodenum was normal. Biopsies were taken with        a cold forceps for histology. Estimated blood loss was        minimal.   Moderate Sedation:        N/A   Impression:          - Z-line regular, 40 cm from the incisors.                        - Normal esophagus.                        - Non-bleeding gastric ulcers with no                        stigmata of bleeding. Likely due to Motrin                        use. Possible factor contributing to                        abdominal pain. Biopsied.                        - Normal examined duodenum. Biopsied. Recommendation:      - Discharge patient to home (ambulatory).                      - Await pathology results.                        - Use Prilosec (omeprazole) 20 mg PO BID                        for 8 weeks then reduce dose to 20 mg once                        daily                        - Minimize Motrin as possible                        - Return to referring physician as                        previously scheduled. Francesco Rosas MD   2/10/2022 4:42:03 PM   Narrative  Performed by Raman Vasquez  Initial [place]: Paula Jarvis MD       CT ABDOMEN PELVIS WITH CONTRAST  01/06/2022     COMPARISON STUDY: CT abdomen and pelvis 10/22/2021     HISTORY: RUQ and LUQ abdominal pain, hx of chronic pancreatitis, r/o   acute/necrotic pancreatitis     PROTOCOL:  APPENDICITIS / DIVERTICULITIS   3D PROCESSING: None   IV CONTRAST:125 mL Isovue-300   ORAL CONTRAST: None   COMPLICATIONS: None   DFOV 480 mm. FINDINGS:     The lung bases are clear. No pleural or pericardial effusion. Stable appearance of the liver. Subcentimeter hepatic hypodensities are   too small to characterize. Unremarkable adrenal glands, spleen,   pancreas. The gallbladder is not identified. Normal caliber abdominal   aorta. Subcentimeter retroperitoneal lymph nodes. Fluid and fat within   both inguinal canals. Unremarkable urinary bladder. No intrapelvic free   fluid. Symmetric renal cortical enhancement. No hydronephrosis. No evidence of appendicitis, bowel obstruction or pneumoperitoneum. L3-L5 spinal fusion hardware. No acute osseous abnormality. Subjective   HPI   Patient calling in today to discuss ongoing health issues. States that he had a recent pancreatic flareup and had multiple testing done and was also found to have 6 gastric ulcers. States they have started him on a PPI. He has an appointment with pain management for a celiac block in March, follow-up in may with nutritionist.  States his job is wanting him to start traveling again next week he does not feel that he is able to do this at this time because he can barely eat anything. He has reverted back to hard candy and potatoes and bread as this is the only thing he can tolerate. He is trying to work on protein powder and smoothies when he can. His weight is down again to 207. Stable on other medications and would like a work note to excuse him from traveling until he can see pain management. States he has been noting some spiderlike veins on his left side of his trunk/upper abdomen.   States they do become painful when he is having pancreatic pain and then sometimes look almost disappeared when the pain is calm. States they always have a bruise like look to them. Review of Systems       Objective   Patient-Reported Vitals  No data recorded     Physical Exam  [INSTRUCTIONS:  \"[x]\" Indicates a positive item  \"[]\" Indicates a negative item  -- DELETE ALL ITEMS NOT EXAMINED]    Constitutional: [x] Appears well-developed and well-nourished [x] No apparent distress      [] Abnormal -     Mental status: [x] Alert and awake  [x] Oriented to person/place/time [x] Able to follow commands    [] Abnormal -     Eyes:   EOM    [x]  Normal    [] Abnormal -   Sclera  [x]  Normal    [] Abnormal -          Discharge [x]  None visible   [] Abnormal -     HENT: [x] Normocephalic, atraumatic  [] Abnormal -   [x] Mouth/Throat: Mucous membranes are moist    External Ears [x] Normal  [] Abnormal -    Neck: [x] No visualized mass [] Abnormal -     Pulmonary/Chest: [x] Respiratory effort normal   [x] No visualized signs of difficulty breathing or respiratory distress        [] Abnormal -      Musculoskeletal:   [x] Normal gait with no signs of ataxia         [x] Normal range of motion of neck        [] Abnormal -     Neurological:        [x] No Facial Asymmetry (Cranial nerve 7 motor function) (limited exam due to video visit)          [x] No gaze palsy        [] Abnormal -          Skin:        [x] No significant exanthematous lesions or discoloration noted on facial skin         [] Abnormal -            Psychiatric:       [x] Normal Affect [] Abnormal -        [x] No Hallucinations    Other pertinent observable physical exam findings:-               Chloe Subramanian, was evaluated through a synchronous (real-time) audio-video encounter. The patient (or guardian if applicable) is aware that this is a billable service, which includes applicable co-pays.  This Virtual Visit was conducted with patient's (and/or legal guardian's) consent. The visit was conducted pursuant to the emergency declaration under the [facility], [address] [place] [address] authority and the [employer] General Act. Patient identification was verified, and a caregiver was present when appropriate. The patient was located at home in a state where the provider was licensed to provide care.        --Nicole Rogers, APRN - CNP

## 2022-02-18 NOTE — PROGRESS NOTES
Visit Information    Have you changed or started any medications since your last visit including any over-the-counter medicines, vitamins, or herbal medicines? no   Have you stopped taking any of your medications? Is so, why? -  no  Are you having any side effects from any of your medications? - no    Have you seen any other physician or provider since your last visit?  no   Have you had any other diagnostic tests since your last visit?  no   Have you been seen in the emergency room and/or had an admission in a hospital since we last saw you?  no   Have you had your routine dental cleaning in the past 6 months?  no     Do you have an active MyChart account? If no, what is the barrier?   Yes    Patient Care Team:  TERENCE Crespo CNP as PCP - General (Nurse Practitioner)  TERENCE [facility]respo CNP as PCP [facility]    Medical History Review  Past Medical, Family, and Social History reviewed and  contribute to the patient presenting condition    Health Maintenance   Topic Date Due    Depression Screen  Never done    Flu vaccine (1) 09/01/2021    COVID-19 Vaccine (3 - Booster for Smith Peter series) 10/08/2021    TSH testing  12/28/2022    Lipid screen  10/30/2024    DTaP/Tdap/Td vaccine (3 - Td or Tdap) 08/09/2029    Colorectal Cancer Screen  04/28/2031    Hepatitis C screen  Completed    HIV screen  Addressed    Hepatitis A vaccine  Aged Out    Hepatitis B vaccine  Aged Out    Hib vaccine  Aged Out    Meningococcal (ACWY) vaccine  Aged Out    Pneumococcal 0-64 years Vaccine  Aged Out

## 2022-03-25 ENCOUNTER — TELEMEDICINE (OUTPATIENT)
Dept: FAMILY MEDICINE CLINIC | Age: 47
End: 2022-03-25
Payer: COMMERCIAL

## 2022-03-25 DIAGNOSIS — K86.1 CHRONIC BILIARY PANCREATITIS (HCC): Primary | ICD-10-CM

## 2022-03-25 DIAGNOSIS — I78.1 SYMPTOMATIC SPIDER ANGIOMA: ICD-10-CM

## 2022-03-25 PROBLEM — G90.511 COMPLEX REGIONAL PAIN SYNDROME TYPE 1 OF RIGHT UPPER EXTREMITY: Status: ACTIVE | Noted: 2022-03-25

## 2022-03-25 PROCEDURE — 99213 OFFICE O/P EST LOW 20 MIN: CPT | Performed by: NURSE PRACTITIONER

## 2022-03-25 RX ORDER — OMEPRAZOLE 20 MG/1
CAPSULE, DELAYED RELEASE ORAL
COMMUNITY
Start: 2022-03-10

## 2022-03-25 NOTE — PROGRESS NOTES
Visit Information    Have you changed or started any medications since your last visit including any over-the-counter medicines, vitamins, or herbal medicines? no   Have you stopped taking any of your medications? Is so, why? -  no  Are you having any side effects from any of your medications? - no    Have you seen any other physician or provider since your last visit?  no   Have you had any other diagnostic tests since your last visit?  no   Have you been seen in the emergency room and/or had an admission in a hospital since we last saw you?  no   Have you had your routine dental cleaning in the past 6 months?  no     Do you have an active MyChart account? If no, what is the barrier?   Yes    Patient Care Team:  TERENCE Velasquez CNP as PCP - General (Nurse Practitioner)  TERENCE Velasquez CNP as PCP - [facility] Provider    Medical History Review  Past Medical, Family, and Social History reviewed and  contribute to the patient presenting condition    Health Maintenance   Topic Date Due    Flu vaccine (1) 09/01/2021    COVID-19 Vaccine (3 - Booster for Smith Peter series) 10/08/2021    TSH testing  12/28/2022    Depression Screen  02/18/2023    Diabetes screen  06/16/2023    Lipid screen  10/30/2024    DTaP/Tdap/Td vaccine (3 - Td or Tdap) 08/09/2029    Colorectal Cancer Screen  04/28/2031    Hepatitis C screen  Completed    HIV screen  Addressed    Hepatitis A vaccine  Aged Out    Hepatitis B vaccine  Aged Out    Hib vaccine  Aged Out    Meningococcal (ACWY) vaccine  Aged Out    Pneumococcal 0-64 years Vaccine  Aged Out

## 2022-04-02 DIAGNOSIS — R11.0 NAUSEA: ICD-10-CM

## 2022-04-02 DIAGNOSIS — E44.1 MILD PROTEIN-CALORIE MALNUTRITION (HCC): ICD-10-CM

## 2022-04-02 DIAGNOSIS — K86.1 CHRONIC BILIARY PANCREATITIS (HCC): Primary | ICD-10-CM

## 2022-04-04 RX ORDER — ONDANSETRON 4 MG/1
TABLET, ORALLY DISINTEGRATING ORAL
Qty: 90 TABLET | Refills: 5 | Status: SHIPPED | OUTPATIENT
Start: 2022-04-04

## 2022-04-04 RX ORDER — DRONABINOL 2.5 MG/1
CAPSULE ORAL
Qty: 60 CAPSULE | Refills: 4 | Status: SHIPPED | OUTPATIENT
Start: 2022-04-04 | End: 2022-05-04

## 2022-04-12 ENCOUNTER — TELEMEDICINE (OUTPATIENT)
Dept: FAMILY MEDICINE CLINIC | Age: 47
End: 2022-04-12
Payer: COMMERCIAL

## 2022-04-12 DIAGNOSIS — E89.0 POSTSURGICAL HYPOTHYROIDISM: ICD-10-CM

## 2022-04-12 DIAGNOSIS — K85.90 ACUTE ON CHRONIC PANCREATITIS (HCC): ICD-10-CM

## 2022-04-12 DIAGNOSIS — K86.1 ACUTE ON CHRONIC PANCREATITIS (HCC): ICD-10-CM

## 2022-04-12 DIAGNOSIS — I99.8 ISCHEMIA: ICD-10-CM

## 2022-04-12 DIAGNOSIS — E16.2 HYPOGLYCEMIA: Primary | ICD-10-CM

## 2022-04-12 PROCEDURE — 99214 OFFICE O/P EST MOD 30 MIN: CPT | Performed by: NURSE PRACTITIONER

## 2022-04-12 RX ORDER — FLASH GLUCOSE SCANNING READER
1 EACH MISCELLANEOUS CONTINUOUS
Qty: 1 EACH | Refills: 5 | Status: SHIPPED | OUTPATIENT
Start: 2022-04-12

## 2022-04-12 RX ORDER — PREGABALIN 50 MG/1
50 CAPSULE ORAL 2 TIMES DAILY
COMMUNITY
Start: 2022-04-09 | End: 2022-07-02

## 2022-04-12 RX ORDER — LEVOTHYROXINE SODIUM 175 UG/1
175 TABLET ORAL DAILY
Qty: 90 TABLET | Refills: 1 | Status: SHIPPED | OUTPATIENT
Start: 2022-04-12 | End: 2022-07-02

## 2022-04-12 RX ORDER — FLASH GLUCOSE SENSOR
1 KIT MISCELLANEOUS
Qty: 2 EACH | Refills: 5 | Status: SHIPPED | OUTPATIENT
Start: 2022-04-12 | End: 2022-08-21

## 2022-04-12 SDOH — ECONOMIC STABILITY: FOOD INSECURITY: WITHIN THE PAST 12 MONTHS, YOU WORRIED THAT YOUR FOOD WOULD RUN OUT BEFORE YOU GOT MONEY TO BUY MORE.: NEVER TRUE

## 2022-04-12 SDOH — ECONOMIC STABILITY: FOOD INSECURITY: WITHIN THE PAST 12 MONTHS, THE FOOD YOU BOUGHT JUST DIDN'T LAST AND YOU DIDN'T HAVE MONEY TO GET MORE.: NEVER TRUE

## 2022-04-12 ASSESSMENT — SOCIAL DETERMINANTS OF HEALTH (SDOH): HOW HARD IS IT FOR YOU TO PAY FOR THE VERY BASICS LIKE FOOD, HOUSING, MEDICAL CARE, AND HEATING?: NOT HARD AT ALL

## 2022-04-12 NOTE — PROGRESS NOTES
Visit Information    Have you changed or started any medications since your last visit including any over-the-counter medicines, vitamins, or herbal medicines? no   Have you stopped taking any of your medications? Is so, why? -  no  Are you having any side effects from any of your medications? - no    Have you seen any other physician or provider since your last visit?  no   Have you had any other diagnostic tests since your last visit?  no   Have you been seen in the emergency room and/or had an admission in a hospital since we last saw you?  no   Have you had your routine dental cleaning in the past 6 months?  no     Do you have an active MyChart account? If no, what is the barrier?   Yes    Patient Care Team:  TERENCE Daigle CNP as PCP - General (Nurse Practitioner)  TERENCE Daigle CNP as PCP - [facility] Provider    Medical History Review  Past Medical, Family, and Social History reviewed and  contribute to the patient presenting condition    Health Maintenance   Topic Date Due    COVID-19 Vaccine (3 - Booster for Smith Peter series) 10/08/2021    Flu vaccine (Season Ended) 09/01/2022    TSH testing  12/28/2022    Depression Screen  02/18/2023    Diabetes screen  06/16/2023    Lipid screen  10/30/2024    DTaP/Tdap/Td vaccine (3 - Td or Tdap) 08/09/2029    Colorectal Cancer Screen  04/28/2031    Hepatitis C screen  Completed    HIV screen  Addressed    Hepatitis A vaccine  Aged Out    Hepatitis B vaccine  Aged Out    Hib vaccine  Aged Out    Meningococcal (ACWY) vaccine  Aged Out    Pneumococcal 0-64 years Vaccine  Aged Out

## 2022-04-12 NOTE — PROGRESS NOTES
David Tanner (:  1975) is a Established patient, here for evaluation of the following:    Assessment & Plan   Below is the assessment and plan developed based on review of pertinent history, physical exam, labs, studies, and medications. 1. Hypoglycemia  Will try for CGM to monitor better and cause him less pain as he needs close monitoring and f/u with endo asap  High protein/fiber diet  -     Continuous Blood Gluc  (FREESTYLE CONSUELO 14 DAY READER) ILA; 1 each by Does not apply route continuous, Disp-1 each, R-5Normal  -     Continuous Blood Gluc Sensor (FREESTYLE CONSUELO 14 DAY SENSOR) MISC; 1 each by Does not apply route every 14 days, Disp-2 each, R-5Normal  2. Acute on chronic pancreatitis (HCC)  Continue with GI, has appt with new MD tomorrow DR. Sweet  Diet as tolerated for now  Continues on marinol for appetite and working most of the time  Has no hx of alcohol use    -     Continuous Blood Gluc  (FREESTYLE CONSUELO 14 DAY READER) ILA; 1 each by Does not apply route continuous, Disp-1 each, R-5Normal  -     Continuous Blood Gluc Sensor (FREESTYLE CONSUELO 14 DAY SENSOR) MISC; 1 each by Does not apply route every 14 days, Disp-2 each, R-5Normal  3. Ischemia  Referral given and pt will call for appt    -     [facility] - Candie Swain MD, Vascular Surgery, [place]  4. Postsurgical hypothyroidism  tsh 0.13 recently  Recheck labs in 6 weeks and ask endo to take over this problem when sees them for f/u  Continue 175 mcg dose for now    -     TSH; Future  -     T4, Free; Future  -     levothyroxine (SYNTHROID) 175 MCG tablet; Take 1 tablet by mouth daily, Disp-90 tablet, R-1Normal    Return if symptoms worsen or fail to improve. Narrative  Performed by [facility]  CT ABDOMEN PELVIS WITH CONTRAST  2022     HISTORY: Pancreatitis, persistent.  Acute on chronic pancreatitis with   persistent pain     COMPARISON: CT abdomen pelvis 2022     PROTOCOL: ABDOMEN / PELVIS   IV CONTRAST: 125 mL ISOVUE-300   ORAL CONTRAST: Gastrografin   DFOV: 450  mm   3D: None     FINDINGS:     LOWER THORAX: Normal.     LIVER: A few punctate hypodensities in the liver (series 3#10; #13) are   too small to characterize, but are unchanged and statistically likely   benign. BILIARY SYSTEM: Status post cholecystectomy.  No biliary dilation. PANCREAS: The pancreas is diffusely, moderately atrophic, but with no   main ductal dilation, focal mass, or peripancreatic inflammation. SPLEEN: Normal.     ADRENAL GLANDS: Normal.     KIDNEYS / [place] Parisian.     BLADDER: The urinary bladder is entirely decompressed, but with no   significant abnormality within this limitation. PELVIC ORGANS: Normal.     BOWEL: Normal.     PERITONEUM/RETROPERITONEUM: Normal. No free air or fluid. LYMPH NODES: No enlarged nodes. VESSELS: Normal.     BONES: No aggressive lesions. Operative changes posterior spinal fusion   at the L4-S1 levels with associated laminectomy defects. BODY WALL: Normal.    Procedure Note    Radha Lowry MD - 04/09/2022   Formatting of this note might be different from the original.   CT ABDOMEN PELVIS WITH CONTRAST  04/08/2022     HISTORY: Pancreatitis, persistent. Acute on chronic pancreatitis with   persistent pain     COMPARISON: CT abdomen pelvis 4/1/2022     PROTOCOL: ABDOMEN / PELVIS   IV CONTRAST: 125 mL ISOVUE-300   ORAL CONTRAST: Gastrografin   DFOV: 450  mm   3D: None     FINDINGS:     LOWER THORAX: Normal.     LIVER: A few punctate hypodensities in the liver (series 3#10; #13) are   too small to characterize, but are unchanged and statistically likely   benign. BILIARY SYSTEM: Status post cholecystectomy.  No biliary dilation. PANCREAS: The pancreas is diffusely, moderately atrophic, but with no   main ductal dilation, focal mass, or peripancreatic inflammation.      SPLEEN: Normal.     ADRENAL GLANDS: Normal.     KIDNEYS / URETERS: Normal.     BLADDER: The urinary bladder is entirely decompressed, but with no   significant abnormality within this limitation. PELVIC ORGANS: Normal.     BOWEL: Normal.     PERITONEUM/RETROPERITONEUM: Normal. No free air or fluid. LYMPH NODES: No enlarged nodes. VESSELS: Normal.     BONES: No aggressive lesions. Operative changes posterior spinal fusion   at the L4-S1 levels with associated laminectomy defects. BODY WALL: Normal.     IMPRESSION:   IMPRESSION:     1. No acute abnormality in the abdomen or pelvis. 2. The pancreas is atrophic relative to patient age, but with no main   ductal dilation or peripancreatic inflammation/fluid collections. Subjective   HPI   Pt. Calling in today for hospital f/u. States he has been struggling with a 5 weeks pancreatitis flare up and finally went to ER on 4-1-22 and was admitted until 4-9-22. Acute on chronic pancreatitis was diagnosed as well as POSSIBLE endocrine tumor preventing him from releasing glucagon causing him to have significant hypoglycemia with no improvement from sugar intake. he is checking his BG very frequently and having a lot of pain in his hands from this. He wonders about CGM for closer monitoring. He has info to call endo at [facility] for mgmt of this and will schd. Appt. He also was told to f/u with pcp for referral to vascular for abd. Ischemia and protuberant vessels's in his abd. When he is in pain. He states thyroid also changed significantly and dose was decreased to 175 mcg. He needs refills and new labs for 6 weeks. He has lost about 20 lb. Since last appt and having hard time with nausea and unable to eat much. Denies chest pain, sob, weakness or swelling.          Review of Systems       Objective   Patient-Reported Vitals  No data recorded     Physical Exam  [INSTRUCTIONS:  \"[x]\" Indicates a positive item  \"[]\" Indicates a negative item  -- DELETE ALL ITEMS NOT EXAMINED]    Constitutional: [x] Appears well-developed and well-nourished [x] No apparent distress      [] Abnormal -     Mental status: [x] Alert and awake  [x] Oriented to person/place/time [x] Able to follow commands    [] Abnormal -     Eyes:   EOM    [x]  Normal    [] Abnormal -   Sclera  [x]  Normal    [] Abnormal -          Discharge [x]  None visible   [] Abnormal -     HENT: [x] Normocephalic, atraumatic  [] Abnormal -   [x] Mouth/Throat: Mucous membranes are moist    External Ears [x] Normal  [] Abnormal -    Neck: [x] No visualized mass [] Abnormal -     Pulmonary/Chest: [x] Respiratory effort normal   [x] No visualized signs of difficulty breathing or respiratory distress        [] Abnormal -      Musculoskeletal:   [x] Normal gait with no signs of ataxia         [x] Normal range of motion of neck        [] Abnormal -     Neurological:        [x] No Facial Asymmetry (Cranial nerve 7 motor function) (limited exam due to video visit)          [x] No gaze palsy        [] Abnormal -          Skin:        [x] No significant exanthematous lesions or discoloration noted on facial skin         [] Abnormal -            Psychiatric:       [x] Normal Affect [] Abnormal -        [x] No Hallucinations    Other pertinent observable physical exam findings:-                 Ferd Kidney, was evaluated through a synchronous (real-time) audio-video encounter. The patient (or guardian if applicable) is aware that this is a billable service, which includes applicable co-pays. This Virtual Visit was conducted with patient's (and/or legal guardian's) consent. The visit was conducted pursuant to the emergency declaration under the [address] [place] [address] authority and the [employer] General Act. Patient identification was verified, and a caregiver was present when appropriate. The patient was located at home in a state where the provider was licensed to provide care.        --TERENCE Martinez - CNP

## 2022-04-13 ENCOUNTER — PATIENT MESSAGE (OUTPATIENT)
Dept: FAMILY MEDICINE CLINIC | Age: 47
End: 2022-04-13

## 2022-04-13 RX ORDER — NITROFURANTOIN 25; 75 MG/1; MG/1
100 CAPSULE ORAL 2 TIMES DAILY
Qty: 20 CAPSULE | Refills: 0 | Status: SHIPPED | OUTPATIENT
Start: 2022-04-13 | End: 2022-04-23

## 2022-04-13 NOTE — TELEPHONE ENCOUNTER
From: Emma Schumacher  To: Marine Bones  Sent: 4/13/2022 5:57 AM EDT  Subject: Bladder infection     Good morning Tevin Bridegroom. Sorry to be a pest.   Yesterday evening, about 6pm, I noticed that I had some burning as I urinated and the urine was slightly cloudy. By 9pm, it really burned to pee. I woke up this morning shivering. It still burns to pee and my urine is very cloudy. I took my temp this AM, and its 99.6, normal is 97.3. Its been 10 years, but I have had bladder infections before. This is just like it. If youre amenable, can I talk you into calling in some meds for a bladder infection? Or should I make a bee line to urgent care?     Thank you

## 2022-04-25 ENCOUNTER — TELEPHONE (OUTPATIENT)
Dept: FAMILY MEDICINE CLINIC | Age: 47
End: 2022-04-25

## 2022-04-25 NOTE — TELEPHONE ENCOUNTER
Electronic PA stating Continuous Blood Gluc  (GigathleteE 14 DAY READER) has been approved    Sent Mychart message to patient

## 2022-07-02 ENCOUNTER — HOSPITAL ENCOUNTER (OUTPATIENT)
Age: 47
Setting detail: SPECIMEN
Discharge: HOME | End: 2022-07-02

## 2022-07-02 ENCOUNTER — OFFICE VISIT (OUTPATIENT)
Dept: PRIMARY CARE CLINIC | Age: 47
End: 2022-07-02
Payer: COMMERCIAL

## 2022-07-02 VITALS
SYSTOLIC BLOOD PRESSURE: 133 MMHG | OXYGEN SATURATION: 98 % | TEMPERATURE: 98.5 F | DIASTOLIC BLOOD PRESSURE: 84 MMHG | HEART RATE: 79 BPM

## 2022-07-02 DIAGNOSIS — R51.9 HEADACHE DUE TO VIRAL INFECTION: ICD-10-CM

## 2022-07-02 DIAGNOSIS — B34.9 HEADACHE DUE TO VIRAL INFECTION: ICD-10-CM

## 2022-07-02 DIAGNOSIS — J02.9 SORE THROAT: ICD-10-CM

## 2022-07-02 DIAGNOSIS — J06.9 VIRAL URI: Primary | ICD-10-CM

## 2022-07-02 DIAGNOSIS — J06.9 VIRAL URI: ICD-10-CM

## 2022-07-02 LAB — S PYO AG THROAT QL: NORMAL

## 2022-07-02 PROCEDURE — 99213 OFFICE O/P EST LOW 20 MIN: CPT | Performed by: NURSE PRACTITIONER

## 2022-07-02 PROCEDURE — 87880 STREP A ASSAY W/OPTIC: CPT | Performed by: NURSE PRACTITIONER

## 2022-07-02 RX ORDER — BUTALBITAL, ACETAMINOPHEN AND CAFFEINE 50; 325; 40 MG/1; MG/1; MG/1
1 TABLET ORAL EVERY 4 HOURS PRN
Qty: 30 TABLET | Refills: 0 | Status: SHIPPED | OUTPATIENT
Start: 2022-07-02

## 2022-07-02 RX ORDER — LEVOTHYROXINE SODIUM 0.2 MG/1
TABLET ORAL
COMMUNITY
Start: 2022-06-18

## 2022-07-02 ASSESSMENT — ENCOUNTER SYMPTOMS
EYE REDNESS: 0
CHEST TIGHTNESS: 0
SINUS PRESSURE: 0
SORE THROAT: 1
SHORTNESS OF BREATH: 0
COUGH: 0
VOICE CHANGE: 0
WHEEZING: 0
EYE DISCHARGE: 0

## 2022-07-02 NOTE — PROGRESS NOTES
[address] WALK IN CARE  [address] [address]  Dept: [phone]  Dept Fax: [phone]     Tamara Loco is a 49 y.o. male who presents to the urgent care today for his medicalconditions/complaints as noted below. Tamara Loco is c/o of Pharyngitis (sore throat, headache, congestion x 2 days )      HPI:      Pharyngitis  This is a new problem. Episode onset: 2 days ago. Associated symptoms include congestion, headaches and a sore throat. Pertinent negatives include no chest pain, chills, coughing, fatigue, fever, myalgias, rash or weakness. The symptoms are aggravated by drinking, eating and swallowing. Treatments tried: otc tx. The treatment provided no relief.        Past Medical History:   Diagnosis Date    Anxiety     Back pain     Cellulitis     Central spinal stenosis     Cough     Diarrhea     Esophageal reflux     Facial trauma     age 14, diving injury    Fatigue     Headache(784.0)     Hypertension     Hypogonadism     Hypothyroidism     hx of hashimoto's    Knee pain     Palpitations            Current Outpatient Medications   Medication Sig Dispense Refill    levothyroxine (SYNTHROID) 200 MCG tablet       butalbital-acetaminophen-caffeine (FIORICET, ESGIC) -40 MG per tablet Take 1 tablet by mouth every 4 hours as needed for Headaches 30 tablet 0    Continuous Blood Gluc  (FREESTYLE CONSUELO 14 DAY READER) ILA 1 each by Does not apply route continuous 1 each 5    Continuous Blood Gluc Sensor (FREESTYLE CONSUELO 14 DAY SENSOR) MISC 1 each by Does not apply route every 14 days 2 each 5    ondansetron (ZOFRAN-ODT) 4 MG disintegrating tablet DISSOLVE ONE TABLET BY MOUTH EVERY 8 HOURS AS NEEDED FOR NAUSEA 90 tablet 5    omeprazole (PRILOSEC) 20 MG delayed release capsule       CREON 37183-600137 units CPEP delayed release capsule       ferrous sulfate (FE TABS 325) 325 (65 Fe) MG EC tablet Take 325 mg by mouth daily      cyclobenzaprine (FLEXERIL) 10 MG tablet TAKE ONE TABLET BY MOUTH THREE TIMES A DAY AS NEEDED FOR MUSCLE SPASMS 60 tablet 3    ibuprofen (ADVIL;MOTRIN) 600 MG tablet Take 1 tablet by mouth 4 times daily as needed for Pain 40 tablet 0    pregabalin (LYRICA) 50 MG capsule Take 50 mg by mouth 2 times daily.  vitamin D (ERGOCALCIFEROL) 1.25 MG (11920 UT) CAPS capsule TAKE ONE CAPSULE BY MOUTH ONCE WEEKLY (Patient not taking: Reported on 7/2/2022) 12 capsule 0     Current Facility-Administered Medications   Medication Dose Route Frequency Provider Last Rate Last Admin    albuterol (PROVENTIL) nebulizer solution 2.5 mg  2.5 mg Nebulization Q6H PRN MellyTERENCE Stokes - SAJAN         Allergies   Allergen Reactions    Levofloxacin Anaphylaxis, Hives, Other (See Comments), Rash and Shortness Of Breath    Amoxicillin Hives and Other (See Comments)     Mouth ulcers    Augmentin [Amoxicillin-Pot Clavulanate] Hives    Cefepime     Gatifloxacin      Hives    Other Hives and Other (See Comments)     suresh     Reviewed PMH, SH, and FH with the patient and updated. Subjective:      Review of Systems   Constitutional: Negative for chills, fatigue and fever. HENT: Positive for congestion, postnasal drip and sore throat. Negative for ear discharge, ear pain, sinus pressure, sneezing and voice change. Eyes: Negative for discharge and redness. Respiratory: Negative for cough, chest tightness, shortness of breath and wheezing. Cardiovascular: Negative. Negative for chest pain. Musculoskeletal: Negative for myalgias. Skin: Negative for rash. Neurological: Positive for headaches. Negative for dizziness, weakness and light-headedness. Hematological: Negative for adenopathy. All other systems reviewed and are negative. Objective:      Physical Exam  Vitals and nursing note reviewed. Constitutional:       General: He is not in acute distress.      Appearance: Normal appearance. He is well-developed. He is not ill-appearing, toxic-appearing or diaphoretic. HENT:      Head: Normocephalic. Right Ear: Tympanic membrane and external ear normal.      Left Ear: Tympanic membrane and external ear normal.      Nose: Nose normal.      Right Sinus: No maxillary sinus tenderness or frontal sinus tenderness. Left Sinus: No maxillary sinus tenderness or frontal sinus tenderness. Mouth/Throat:      Pharynx: Oropharyngeal exudate (PND) and posterior oropharyngeal erythema present. Eyes:      General:         Right eye: No discharge. Left eye: No discharge. Cardiovascular:      Rate and Rhythm: Normal rate and regular rhythm. Heart sounds: Normal heart sounds. No murmur heard. Pulmonary:      Effort: Pulmonary effort is normal. No respiratory distress. Breath sounds: Normal breath sounds. No wheezing or rales. Lymphadenopathy:      Cervical: No cervical adenopathy. Skin:     General: Skin is warm. Findings: No rash. Neurological:      Mental Status: He is alert. /84   Pulse 79   Temp 98.5 °F (36.9 °C) (Tympanic)   SpO2 98%     Results for orders placed or performed in visit on 07/02/22   POCT rapid strep A   Result Value Ref Range    Strep A Ag None Detected None Detected     Assessment:       Diagnosis Orders   1. Viral URI  COVID-19   2. Sore throat  POCT rapid strep A   3. Headache due to viral infection  butalbital-acetaminophen-caffeine (FIORICET, ESGIC) -40 MG per tablet     Plan: Will send out COVID19 testing. Possible treatment alterations based on the results. Fioricet as needed for headache. Patient instructed to self-quarantine until testing results are back. Patient instructed not to return to work until results are back. Encouraged adequate hydration and rest.  The patient indicates understanding of these issues and agrees with the plan.   Educational materials provided on AVS.  Follow up if symptoms do not improve/worsen. Discussed symptoms that will warrant urgent ED evaluation/treatment. Orders Placed This Encounter   Medications    butalbital-acetaminophen-caffeine (FIORICET, ESGIC) -40 MG per tablet     Sig: Take 1 tablet by mouth every 4 hours as needed for Headaches     Dispense:  30 tablet     Refill:  0        Patient given educational materials - see patient instructions. Discussed use, benefit, and side effects of prescribed medications. All patientquestions answered. Pt voiced understanding.     Electronically signed by TERENCE Peters CNP on 7/2/2022at 3:38 PM

## 2022-07-02 NOTE — PATIENT INSTRUCTIONS
dose. Too much acetaminophen (Tylenol) can be harmful.  Get plenty of rest.   Use saline (saltwater) nasal washes to help keep your nasal passages open and wash out mucus and allergens. You can buy saline nose sprays at a grocery store or drugstore. Follow the instructions on the package. Or you can make your own at home. Add 1 teaspoon of non-iodized salt and 1 teaspoon of baking soda to 2 cups of distilled or boiled and cooled water. Fill a squeeze bottle or neti pot with the nasal wash. Then put the tip into your nostril, and lean over the sink. With your mouth open, gently squirt the liquid. Repeat on the other side.  Use a vaporizer or humidifier to add moisture to your bedroom. Follow the instructions for cleaning the machine.  Do not smoke or allow others to smoke around you. If you need help quitting, talk to your doctor about stop-smoking programs and medicines. These can increase your chances of quitting for good. When should you call for help? Call 911 anytime you think you may need emergency care. For example, call if:     You have severe trouble breathing. Call your doctor now or seek immediate medical care if:     You have a new or higher fever.      Your fever lasts more than 48 hours.      You have trouble breathing.      You have a fever with a stiff neck or a severe headache.      You are sensitive to light.      You feel very sleepy or confused. Watch closely for changes in your health, and be sure to contact your doctor if:     You do not get better as expected. Where can you learn more? Go to [email or web address] org and sign in to your [employer] account. Enter R023 in the [employer] box to learn more about \"Viral Respiratory Infection: Care Instructions. \"     If you do not have an account, please click on the \"Sign Up Now\" link. Current as of: February 9, 2022               Content Version: 13.3  © 0050-3795 Healthwise, Incorporated.    Care instructions adapted under license by [facility] ([facility]). If you have questions about a medical condition or this instruction, always ask your healthcare professional. [address] any warranty or liability for your use of this information.

## 2022-07-03 LAB
SARS-COV-2: NORMAL
SARS-COV-2: NOT DETECTED
SOURCE: NORMAL

## 2022-08-19 DIAGNOSIS — E16.2 HYPOGLYCEMIA: ICD-10-CM

## 2022-08-19 DIAGNOSIS — K86.1 ACUTE ON CHRONIC PANCREATITIS (HCC): ICD-10-CM

## 2022-08-19 DIAGNOSIS — K85.90 ACUTE ON CHRONIC PANCREATITIS (HCC): ICD-10-CM

## 2022-08-21 RX ORDER — FLASH GLUCOSE SENSOR
KIT MISCELLANEOUS
Qty: 2 EACH | Refills: 3 | Status: SHIPPED | OUTPATIENT
Start: 2022-08-21

## 2022-09-19 ENCOUNTER — TELEPHONE (OUTPATIENT)
Dept: FAMILY MEDICINE CLINIC | Age: 47
End: 2022-09-19

## 2022-09-19 NOTE — TELEPHONE ENCOUNTER
Triage call from [facility] ([facility]) to office     Brief description of triage: one finger on left hand is numb on the side. Pt stated he injured it a month ago and it the past two weeks it has gotten worse. Onset? One month ago    Location? Finger on left hand     Pattern? N/a     Severity on a scale of 1-10? N/a    Radiation? none    Pregnancy? N/a    Outcome? Offered multiple appts with albert. PT declined and wanted an appt today. Unfortunately  albert did not have any openings. The walk in was then offered.

## 2022-09-22 DIAGNOSIS — E44.1 MILD PROTEIN-CALORIE MALNUTRITION (HCC): ICD-10-CM

## 2022-09-22 DIAGNOSIS — R11.0 NAUSEA: ICD-10-CM

## 2022-09-22 DIAGNOSIS — K86.1 CHRONIC BILIARY PANCREATITIS (HCC): Primary | ICD-10-CM

## 2022-09-22 RX ORDER — DRONABINOL 2.5 MG/1
CAPSULE ORAL
Qty: 60 CAPSULE | Refills: 3 | Status: SHIPPED | OUTPATIENT
Start: 2022-09-22 | End: 2022-10-22

## 2022-11-15 RX ORDER — LEVOTHYROXINE SODIUM 0.2 MG/1
TABLET ORAL
Qty: 90 TABLET | Refills: 1 | Status: SHIPPED | OUTPATIENT
Start: 2022-11-15

## 2022-12-08 ENCOUNTER — TELEPHONE (OUTPATIENT)
Dept: FAMILY MEDICINE CLINIC | Age: 47
End: 2022-12-08

## 2022-12-08 NOTE — TELEPHONE ENCOUNTER
----- Message from Zoila Stanford sent at 12/8/2022  8:20 AM EST -----  Subject: Appointment Request    Reason for Call: Established Patient Appointment needed: Routine Existing   Condition Follow Up    QUESTIONS    Reason for appointment request? Available appointments did not meet   patient need     Additional Information for Provider? Patient would like to follow-up with   pancreatitis & his blood sugar levels.  Patient is off work on 12/9.   screened green on 12/8   ---------------------------------------------------------------------------  --------------  [id number]  [phone]; OK to leave message on voicemail  ---------------------------------------------------------------------------  --------------  SCRIPT ANSWERS  LEAID Screen: Ren Martinez

## 2022-12-08 NOTE — TELEPHONE ENCOUNTER
Patient called in and scheduled for 12/20. Patient states he has been having issues with reactive hypoglycemia worsening. He states his level will drop from 150/155 to 60 in minutes and he becomes really dizzy when this happens. He wants to know of recommendations for this to help in the meantime until his upcoming appt.

## 2022-12-08 NOTE — TELEPHONE ENCOUNTER
Wife will have pt call  No appts for 12-9 due to no providers in the office recommend appt next week

## 2022-12-09 NOTE — TELEPHONE ENCOUNTER
Would make sure his GI MD knows this, would keep hard candy around or get some glucose tabs to have on hand and high protein snacks or meals

## 2022-12-20 ENCOUNTER — OFFICE VISIT (OUTPATIENT)
Dept: FAMILY MEDICINE CLINIC | Age: 47
End: 2022-12-20
Payer: COMMERCIAL

## 2022-12-20 ENCOUNTER — HOSPITAL ENCOUNTER (OUTPATIENT)
Age: 47
Discharge: HOME | End: 2022-12-20
Payer: COMMERCIAL

## 2022-12-20 VITALS
HEART RATE: 56 BPM | WEIGHT: 239.6 LBS | HEIGHT: 74 IN | BODY MASS INDEX: 30.75 KG/M2 | TEMPERATURE: 96.6 F | DIASTOLIC BLOOD PRESSURE: 88 MMHG | SYSTOLIC BLOOD PRESSURE: 136 MMHG | OXYGEN SATURATION: 96 %

## 2022-12-20 DIAGNOSIS — E16.2 HYPOGLYCEMIA: Primary | ICD-10-CM

## 2022-12-20 DIAGNOSIS — K86.1 CHRONIC BILIARY PANCREATITIS (HCC): ICD-10-CM

## 2022-12-20 DIAGNOSIS — D50.9 IRON DEFICIENCY ANEMIA, UNSPECIFIED IRON DEFICIENCY ANEMIA TYPE: ICD-10-CM

## 2022-12-20 DIAGNOSIS — K86.81 EXOCRINE PANCREATIC INSUFFICIENCY: ICD-10-CM

## 2022-12-20 DIAGNOSIS — E06.3 HASHIMOTO'S THYROIDITIS: ICD-10-CM

## 2022-12-20 DIAGNOSIS — K59.01 SLOW TRANSIT CONSTIPATION: ICD-10-CM

## 2022-12-20 DIAGNOSIS — E16.1 IDIOPATHIC POSTPRANDIAL HYPOGLYCEMIA: ICD-10-CM

## 2022-12-20 DIAGNOSIS — E16.2 HYPOGLYCEMIA: ICD-10-CM

## 2022-12-20 LAB
ABSOLUTE EOS #: 0.13 K/UL (ref 0–0.44)
ABSOLUTE IMMATURE GRANULOCYTE: <0.03 K/UL (ref 0–0.3)
ABSOLUTE LYMPH #: 1.42 K/UL (ref 1.1–3.7)
ABSOLUTE MONO #: 0.44 K/UL (ref 0.1–1.2)
ANION GAP SERPL CALCULATED.3IONS-SCNC: 14 MMOL/L (ref 9–17)
BASOPHILS # BLD: 1 % (ref 0–2)
BASOPHILS ABSOLUTE: 0.03 K/UL (ref 0–0.2)
BUN BLDV-MCNC: 21 MG/DL (ref 6–20)
CALCIUM SERPL-MCNC: 8.8 MG/DL (ref 8.6–10.4)
CHLORIDE BLD-SCNC: 102 MMOL/L (ref 98–107)
CO2: 25 MMOL/L (ref 20–31)
CREAT SERPL-MCNC: 0.92 MG/DL (ref 0.7–1.2)
EOSINOPHILS RELATIVE PERCENT: 2 % (ref 1–4)
FERRITIN: 57 NG/ML (ref 30–400)
GFR SERPL CREATININE-BSD FRML MDRD: >60 ML/MIN/1.73M2
GLUCOSE BLD-MCNC: 89 MG/DL (ref 70–99)
HCT VFR BLD CALC: 42.2 % (ref 40.7–50.3)
HEMOGLOBIN: 13.9 G/DL (ref 13–17)
IMMATURE GRANULOCYTES: 0 %
IRON SATURATION: 16 % (ref 20–55)
IRON: 53 UG/DL (ref 59–158)
LIPASE: 25 U/L (ref 13–60)
LYMPHOCYTES # BLD: 23 % (ref 24–43)
MAGNESIUM: 2.3 MG/DL (ref 1.6–2.6)
MCH RBC QN AUTO: 28.8 PG (ref 25.2–33.5)
MCHC RBC AUTO-ENTMCNC: 32.9 G/DL (ref 28.4–34.8)
MCV RBC AUTO: 87.6 FL (ref 82.6–102.9)
MONOCYTES # BLD: 7 % (ref 3–12)
NRBC AUTOMATED: 0 PER 100 WBC
PDW BLD-RTO: 12.2 % (ref 11.8–14.4)
PLATELET # BLD: 151 K/UL (ref 138–453)
PMV BLD AUTO: 11.4 FL (ref 8.1–13.5)
POTASSIUM SERPL-SCNC: 4 MMOL/L (ref 3.7–5.3)
RBC # BLD: 4.82 M/UL (ref 4.21–5.77)
SEG NEUTROPHILS: 67 % (ref 36–65)
SEGMENTED NEUTROPHILS ABSOLUTE COUNT: 4.22 K/UL (ref 1.5–8.1)
SODIUM BLD-SCNC: 141 MMOL/L (ref 135–144)
THYROXINE, FREE: 1.71 NG/DL (ref 0.93–1.7)
TOTAL IRON BINDING CAPACITY: 327 UG/DL (ref 250–450)
TSH SERPL DL<=0.05 MIU/L-ACNC: 2.12 UIU/ML (ref 0.3–5)
UNSATURATED IRON BINDING CAPACITY: 274 UG/DL (ref 112–347)
VITAMIN B-12: 498 PG/ML (ref 232–1245)
VITAMIN D 25-HYDROXY: 24.7 NG/ML
WBC # BLD: 6.3 K/UL (ref 3.5–11.3)

## 2022-12-20 PROCEDURE — 83550 IRON BINDING TEST: CPT

## 2022-12-20 PROCEDURE — 82728 ASSAY OF FERRITIN: CPT

## 2022-12-20 PROCEDURE — 3074F SYST BP LT 130 MM HG: CPT | Performed by: NURSE PRACTITIONER

## 2022-12-20 PROCEDURE — 84425 ASSAY OF VITAMIN B-1: CPT

## 2022-12-20 PROCEDURE — 3078F DIAST BP <80 MM HG: CPT | Performed by: NURSE PRACTITIONER

## 2022-12-20 PROCEDURE — 84443 ASSAY THYROID STIM HORMONE: CPT

## 2022-12-20 PROCEDURE — 84439 ASSAY OF FREE THYROXINE: CPT

## 2022-12-20 PROCEDURE — 36415 COLL VENOUS BLD VENIPUNCTURE: CPT

## 2022-12-20 PROCEDURE — 80048 BASIC METABOLIC PNL TOTAL CA: CPT

## 2022-12-20 PROCEDURE — 99213 OFFICE O/P EST LOW 20 MIN: CPT | Performed by: NURSE PRACTITIONER

## 2022-12-20 PROCEDURE — 82306 VITAMIN D 25 HYDROXY: CPT

## 2022-12-20 PROCEDURE — 83540 ASSAY OF IRON: CPT

## 2022-12-20 PROCEDURE — 83735 ASSAY OF MAGNESIUM: CPT

## 2022-12-20 PROCEDURE — 83690 ASSAY OF LIPASE: CPT

## 2022-12-20 PROCEDURE — 82607 VITAMIN B-12: CPT

## 2022-12-20 PROCEDURE — 85025 COMPLETE CBC W/AUTO DIFF WBC: CPT

## 2022-12-20 RX ORDER — DRONABINOL 2.5 MG/1
CAPSULE ORAL
COMMUNITY
Start: 2022-11-28

## 2022-12-20 ASSESSMENT — ENCOUNTER SYMPTOMS
VOMITING: 0
CONSTIPATION: 1
NAUSEA: 0
SHORTNESS OF BREATH: 0
ABDOMINAL PAIN: 1
COUGH: 0
CHEST TIGHTNESS: 0

## 2022-12-20 NOTE — PROGRESS NOTES
[address]  [place], [address]  [phone]      Afshan Burris is a 49 y.o. male who presents today for his  medicalconditions/complaints as noted below. Afshan Burris is c/o of Blood Sugar Problem (Having issues of BS spiking, usually after eating, has appt with pancreas doctor tomorrow, monitoring with [place] and has spiked high to extreme low) and GI Problem (Question about laxative)  . HPI:    HPI  Patient here today for discussion about postprandial hypoglycemia that has been occurring for the past several months but worsening over the last 1 month. States he did see an endocrinologist in May 2022 at [facility] but states they did not really have much suggestion at the time besides a mixed meal study which she declined as it was invasive and time-consuming. States he has been trying to stick to 140 g of protein per day and avoiding simple carbs is much as possible but eating often in small portions. States he has a freestyle clau continuous glucose monitor and is having blood sugars around 140 or 150 after a meal and then suddenly crashing to 50 where he is symptomatic with dizziness and feeling drunk. States he is hard candy on hand at all times and sometimes will have to drink a half a soda to get it to increase. He almost went to the ER 2 days ago for this. Also would like new blood work. Has an appointment with his GI/pancreas specialist tomorrow. Would like referral to new endocrinologist as he is having a hard time communicating with them at [facility]. Please note that this chart was generated using voice recognition Dragon dictation software. Although every effort was made to ensure the accuracy of this automated transcription, some errors in transcription may have occurred.     Past Medical History:   Diagnosis Date    Anxiety     Back pain     Cellulitis     Central spinal stenosis     Cough     Diarrhea     Esophageal reflux     Facial trauma age 14, diving injury    Fatigue     Headache(784.0)     Hypertension     Hypogonadism     Hypothyroidism     hx of hashimoto's    Knee pain     Palpitations       Past Surgical History:   Procedure Laterality Date    BACK SURGERY      CHOLECYSTECTOMY      KNEE SURGERY      NOSE SURGERY      SHOULDER SURGERY      THYROIDECTOMY      TONSILLECTOMY       Family History   Problem Relation Age of Onset    Heart Disease Mother     Other Father         thyroid dysfunction     Social History     Tobacco Use    Smoking status: Former    Smokeless tobacco: Never   Substance Use Topics    Alcohol use: Yes      Current Outpatient Medications   Medication Sig Dispense Refill    dronabinol (MARINOL) 2.5 MG capsule       Continuous Blood Gluc Sensor (FREESTYLE CONSUELO 3 SENSOR) MISC 2 each by Does not apply route every 14 days 2 each 5    levothyroxine (SYNTHROID) 200 MCG tablet TAKE ONE TABLET BY MOUTH DAILY 90 tablet 1    Continuous Blood Gluc Sensor (FREESTYLE CONSUELO 14 DAY SENSOR) [facility] USE TO CHECK BLOOD SUGAR DAILY AS DIRECTED. CHANGE EVERY 14 DAYS 2 each 3    Continuous Blood Gluc  (FREESTYLE CONSUELO 14 DAY READER) ILA 1 each by Does not apply route continuous 1 each 5    ondansetron (ZOFRAN-ODT) 4 MG disintegrating tablet DISSOLVE ONE TABLET BY MOUTH EVERY 8 HOURS AS NEEDED FOR NAUSEA 90 tablet 5    CREON [id number] units CPEP delayed release capsule       ibuprofen (ADVIL;MOTRIN) 600 MG tablet Take 1 tablet by mouth 4 times daily as needed for Pain 40 tablet 0     Current Facility-Administered Medications   Medication Dose Route Frequency Provider Last Rate Last Admin    albuterol (PROVENTIL) nebulizer solution 2.5 mg  2.5 mg Nebulization Q6H PRN Tejas Greek, APRN - CNP         Allergies   Allergen Reactions    Levofloxacin Anaphylaxis, Hives, Other (See Comments), Rash and Shortness Of Breath    Amoxicillin Hives and Other (See Comments)     Mouth ulcers    Augmentin [Amoxicillin-Pot Clavulanate] Hives Cefepime     Gatifloxacin      Hives    Other Hives and Other (See Comments)     tequin       Health Maintenance   Topic Date Due    COVID-19 Vaccine (3 - Booster for Pfizer series) 07/03/2021    Flu vaccine (1) 08/01/2022    Depression Screen  02/18/2023    Diabetes screen  06/16/2023    Lipids  10/30/2024    DTaP/Tdap/Td vaccine (3 - Td or Tdap) 08/09/2029    Colorectal Cancer Screen  04/28/2031    Hepatitis C screen  Completed    HIV screen  Addressed    Hepatitis A vaccine  Aged Out    Hib vaccine  Aged Out    Meningococcal (ACWY) vaccine  Aged Out    Pneumococcal 0-64 years Vaccine  Aged Out       Subjective:      Review of Systems   Constitutional:  Positive for activity change. Negative for appetite change, chills, diaphoresis, fatigue and fever. Eyes:  Negative for visual disturbance. Respiratory:  Negative for cough, chest tightness and shortness of breath. Cardiovascular:  Negative for chest pain, palpitations and leg swelling. Gastrointestinal:  Positive for abdominal pain (chronic with pancreas) and constipation (MOM not helpful daily would like other suggestions). Negative for nausea and vomiting. Endocrine: Negative for cold intolerance, heat intolerance, polydipsia, polyphagia and polyuria. Skin:  Negative for rash. Neurological:  Positive for dizziness (with low BG). Negative for seizures, syncope, weakness, numbness and headaches. Hematological:  Negative for adenopathy. Psychiatric/Behavioral:  Negative for sleep disturbance. The patient is not nervous/anxious. Objective:      Physical Exam  Vitals and nursing note reviewed. Constitutional:       Appearance: Normal appearance. He is obese. He is not ill-appearing or diaphoretic. HENT:      Head: Normocephalic and atraumatic. Eyes:      Conjunctiva/sclera: Conjunctivae normal.   Pulmonary:      Effort: Pulmonary effort is normal.   Musculoskeletal:      Cervical back: Neck supple.    Skin:     General: Skin is warm and dry.   Neurological:      General: No focal deficit present. Mental Status: He is alert and oriented to person, place, and time. Mental status is at baseline. Psychiatric:         Mood and Affect: Mood normal.         Behavior: Behavior normal.         Thought Content: Thought content normal.         Judgment: Judgment normal.       Assessment:       Diagnosis Orders   1. Hypoglycemia  Basic Metabolic Panel      2. Chronic biliary pancreatitis [facility])  External Referral To Endocrinology      3. Hashimoto's thyroiditis  T4, Free    TSH    External Referral To Endocrinology      4. Iron deficiency anemia, unspecified iron deficiency anemia type  Iron and TIBC    Ferritin    CBC with Auto Differential    Basic Metabolic Panel      5. Exocrine pancreatic insufficiency  Magnesium    Vitamin D 25 Hydroxy    Vitamin B12    Vitamin B1    Lipase    External Referral To Endocrinology      6. Idiopathic postprandial hypoglycemia  External Referral To Endocrinology      7. Slow transit constipation            Plan:      No follow-ups on file. Orders Placed This Encounter   Procedures    Iron and TIBC     Standing Status:   Future     Standing Expiration Date:   12/21/2023     Order Specific Question:   Is Patient Fasting? Answer:   no     Order Specific Question:   No of Hours?      Answer:   no    Magnesium     Standing Status:   Future     Standing Expiration Date:   12/20/2023    Ferritin     Standing Status:   Future     Standing Expiration Date:   12/20/2023    CBC with Auto Differential     Standing Status:   Future     Standing Expiration Date:   13/03/2798    Basic Metabolic Panel     Standing Status:   Future     Standing Expiration Date:   12/20/2023    T4, Free     Standing Status:   Future     Standing Expiration Date:   12/20/2023    TSH     Standing Status:   Future     Standing Expiration Date:   12/20/2023    Vitamin D 25 Hydroxy     Standing Status:   Future     Standing Expiration Date:   12/20/2023 Vitamin B12     Standing Status:   Future     Standing Expiration Date:   12/20/2023    Vitamin B1     Standing Status:   Future     Standing Expiration Date:   12/20/2023    Lipase     Standing Status:   Future     Standing Expiration Date:   12/20/2023    External Referral To Endocrinology     Referral Priority:   Routine     Referral Type:   Eval and Treat     Referral Reason:   Specialty Services Required     Requested Specialty:   Endocrinology     Number of Visits Requested:   1     Reviewed endocrinology note from May 2022. Did suggest mixed meal study to evaluate hypoglycemia and also suggested possible glucagon deficiency. Advised to continue high-protein diet with regular snacking is much as possible for now and new referral to endocrinology given for [facility] clinic for second opinion. Continue to monitor blood glucose often and check new labs at this time. Patient is doing his best at 200 mcg of levothyroxine and would like to continue at this dose of possible.]      Okay to trial MiraLAX or Metamucil for constipation issues if milk of mag is not helpful or can continue both on a daily basis      Patient given educational materials - see patient instructions. Discussed use,benefit, and side effects of prescribed medications. All patient questions answered. Pt voiced understanding. Reviewed health maintenance. Instructed to continue currentmedications, diet and exercise.     Electronically signed by TERENCE Perkins CNP, CNP on 12/20/2022 at 10:14 AM

## 2022-12-20 NOTE — PROGRESS NOTES
Visit Information    Have you changed or started any medications since your last visit including any over-the-counter medicines, vitamins, or herbal medicines? no   Have you stopped taking any of your medications? Is so, why? -  no  Are you having any side effects from any of your medications? - no    Have you seen any other physician or provider since your last visit?  no   Have you had any other diagnostic tests since your last visit?  no   Have you been seen in the emergency room and/or had an admission in a hospital since we last saw you?  no   Have you had your routine dental cleaning in the past 6 months?  no     Do you have an active MyChart account? If no, what is the barrier?   Yes    Patient Care Team:  TERENCE Lew CNP as PCP - General (Nurse Practitioner)  TERENCE Lew CNP as PCP - [facility] Provider    Medical History Review  Past Medical, Family, and Social History reviewed and  contribute to the patient presenting condition    Health Maintenance   Topic Date Due    COVID-19 Vaccine (3 - Booster for [employer] series) 07/03/2021    Flu vaccine (1) 08/01/2022    Depression Screen  02/18/2023    Lipids  10/30/2024    DTaP/Tdap/Td vaccine (3 - Td or Tdap) 08/09/2029    Colorectal Cancer Screen  04/28/2031    Hepatitis C screen  Completed    HIV screen  Addressed    Hepatitis A vaccine  Aged Out    Hib vaccine  Aged Out    Meningococcal (ACWY) vaccine  Aged Out    Pneumococcal 0-64 years Vaccine  Aged Out

## 2022-12-22 DIAGNOSIS — K85.00 IDIOPATHIC ACUTE PANCREATITIS, UNSPECIFIED COMPLICATION STATUS: ICD-10-CM

## 2022-12-22 RX ORDER — OXYCODONE HYDROCHLORIDE AND ACETAMINOPHEN 5; 325 MG/1; MG/1
1 TABLET ORAL EVERY 6 HOURS PRN
Qty: 28 TABLET | Refills: 0 | Status: SHIPPED | OUTPATIENT
Start: 2022-12-22 | End: 2022-12-29

## 2022-12-25 LAB — VITAMIN B1 WHOLE BLOOD: 102 NMOL/L (ref 70–180)

## 2022-12-27 ENCOUNTER — OFFICE VISIT (OUTPATIENT)
Dept: PRIMARY CARE CLINIC | Age: 47
End: 2022-12-27
Payer: COMMERCIAL

## 2022-12-27 VITALS
BODY MASS INDEX: 30.67 KG/M2 | HEIGHT: 74 IN | WEIGHT: 239 LBS | OXYGEN SATURATION: 98 % | SYSTOLIC BLOOD PRESSURE: 129 MMHG | DIASTOLIC BLOOD PRESSURE: 87 MMHG | HEART RATE: 72 BPM

## 2022-12-27 DIAGNOSIS — B37.0 ORAL THRUSH: Primary | ICD-10-CM

## 2022-12-27 PROCEDURE — 99213 OFFICE O/P EST LOW 20 MIN: CPT

## 2022-12-27 PROCEDURE — 3078F DIAST BP <80 MM HG: CPT

## 2022-12-27 PROCEDURE — 3074F SYST BP LT 130 MM HG: CPT

## 2022-12-27 ASSESSMENT — ENCOUNTER SYMPTOMS
FACIAL SWELLING: 0
SORE THROAT: 0
WHEEZING: 0
RESPIRATORY NEGATIVE: 1
VISUAL CHANGE: 0
ANAL BLEEDING: 0
ABDOMINAL DISTENTION: 0
ABDOMINAL PAIN: 0
EYE PAIN: 0
SINUS PAIN: 0
GASTROINTESTINAL NEGATIVE: 1
BLOOD IN STOOL: 0
CHOKING: 0
RECTAL PAIN: 0
EYES NEGATIVE: 1
COLOR CHANGE: 0
RHINORRHEA: 0
EYE ITCHING: 0
PHOTOPHOBIA: 0
APNEA: 0
CHANGE IN BOWEL HABIT: 0
SINUS PRESSURE: 0
CONSTIPATION: 0
CHEST TIGHTNESS: 0
TROUBLE SWALLOWING: 0
EYE REDNESS: 0
BACK PAIN: 0
VOICE CHANGE: 0
SWOLLEN GLANDS: 0
COUGH: 0
EYE DISCHARGE: 0
STRIDOR: 0
DIARRHEA: 0
SHORTNESS OF BREATH: 0
VOMITING: 0
NAUSEA: 0
ROS SKIN COMMENTS: SORE THROAT

## 2022-12-27 NOTE — PROGRESS NOTES
[address] [place] [facility] [id number] [place] [address] WALK IN [facility]  [address] [place] [address]  Dept: [phone]    Evalyn Burkitt is a 49 y.o. male Established patient, who presents to the walk-in clinic today with conditions/complaints as noted below:    Chief Complaint   Patient presents with    Pharyngitis     Pt has been having sore throat and has seen white spots and red spots          HPI:     Pharyngitis  This is a new problem. Episode onset: 2 days ago. The problem occurs constantly. The problem has been rapidly worsening. Pertinent negatives include no abdominal pain, anorexia, arthralgias, change in bowel habit, chest pain, chills, congestion, coughing, diaphoresis, fatigue, fever, headaches, joint swelling, myalgias, nausea, neck pain, numbness, rash, sore throat, swollen glands, urinary symptoms, vertigo, visual change, vomiting or weakness. Nothing aggravates the symptoms. He has tried nothing for the symptoms. Pt has COVID currently, taking Paxlovid. Pt also has noted changes in BGM's but recently been under control in the 90's. Past Medical History:   Diagnosis Date    Anxiety     Back pain     Cellulitis     Central spinal stenosis     Cough     Diarrhea     Esophageal reflux     Facial trauma     age 14, diving injury    Fatigue     Headache(784.0)     Hypertension     Hypogonadism     Hypothyroidism     hx of hashimoto's    Knee pain     Palpitations        Current Outpatient Medications   Medication Sig Dispense Refill    nirmatrelvir/ritonavir (PAXLOVID) 20 x 150 MG & 10 x 100MG TBPK Take 2 pink tablets (nirmatrelvir 300 mg) together with 1 white tablet (ritonavir 100 mg) for a total of 3 tablets by mouth two times daily for 5 days.  Dx: Positive test for SARS-CoV-2. REQUIRED: Tier/Criteria/Lt-vlaoxmfhc-rf specific!: First day of SYMPTOMS (day 0): 12/23. Most recent eGFR: 83 mL/min/1.73m2 on 8/1/2022      nystatin (MYCOSTATIN) 910209 UNIT/ML suspension Swish 5 ml in the mouth, keep in for as long as you can, spit, FOUr times daily. Use for 1 more day after symptoms resolve. 200 mL 0    oxyCODONE-acetaminophen (PERCOCET) 5-325 MG per tablet Take 1 tablet by mouth every 6 hours as needed for Pain for up to 7 days. Intended supply: 7 days. Take lowest dose possible to manage pain 28 tablet 0    dronabinol (MARINOL) 2.5 MG capsule       Continuous Blood Gluc Sensor (FREESTYLE CONSUELO 3 SENSOR) MISC 2 each by Does not apply route every 14 days 2 each 5    levothyroxine (SYNTHROID) 200 MCG tablet TAKE ONE TABLET BY MOUTH DAILY 90 tablet 1    Continuous Blood Gluc Sensor (FREESTYLE CONSUELO 14 DAY SENSOR) [facility] USE TO CHECK BLOOD SUGAR DAILY AS DIRECTED. CHANGE EVERY 14 DAYS 2 each 3    Continuous Blood Gluc  (FREESTYLE CONSUELO 14 DAY READER) ILA 1 each by Does not apply route continuous 1 each 5    ondansetron (ZOFRAN-ODT) 4 MG disintegrating tablet DISSOLVE ONE TABLET BY MOUTH EVERY 8 HOURS AS NEEDED FOR NAUSEA 90 tablet 5    CREON [id number] units CPEP delayed release capsule       ibuprofen (ADVIL;MOTRIN) 600 MG tablet Take 1 tablet by mouth 4 times daily as needed for Pain 40 tablet 0     Current Facility-Administered Medications   Medication Dose Route Frequency Provider Last Rate Last Admin    albuterol (PROVENTIL) nebulizer solution 2.5 mg  2.5 mg Nebulization Q6H PRN TERENCE Saini - CNP           Allergies   Allergen Reactions    Levofloxacin Anaphylaxis, Hives, Other (See Comments), Rash and Shortness Of Breath    Amoxicillin Hives and Other (See Comments)     Mouth ulcers    Augmentin [Amoxicillin-Pot Clavulanate] Hives    Cefepime     Gatifloxacin      Hives    Other Hives and Other (See Comments)     tequin       Review of Systems:     Review of Systems   Constitutional: Negative.   Negative for activity change, appetite change, chills, diaphoresis, fatigue, fever and unexpected weight change. HENT: Negative. Negative for congestion, dental problem, drooling, ear discharge, ear pain, facial swelling, hearing loss, mouth sores, nosebleeds, postnasal drip, rhinorrhea, sinus pressure, sinus pain, sneezing, sore throat, tinnitus, trouble swallowing and voice change. Eyes: Negative. Negative for photophobia, pain, discharge, redness, itching and visual disturbance. Respiratory: Negative. Negative for apnea, cough, choking, chest tightness, shortness of breath, wheezing and stridor. Cardiovascular: Negative. Negative for chest pain, palpitations and leg swelling. Gastrointestinal: Negative. Negative for abdominal distention, abdominal pain, anal bleeding, anorexia, blood in stool, change in bowel habit, constipation, diarrhea, nausea, rectal pain and vomiting. Musculoskeletal: Negative. Negative for arthralgias, back pain, gait problem, joint swelling, myalgias, neck pain and neck stiffness. Skin:  Negative for color change, pallor, rash and wound. Sore throat     Neurological: Negative. Negative for dizziness, vertigo, tremors, seizures, syncope, facial asymmetry, speech difficulty, weakness, light-headedness, numbness and headaches. Physical Exam:      /87 (Site: Left Upper Arm, Position: Sitting, Cuff Size: Large Adult)   Pulse 72   Ht 6' 2\" (1.88 m)   Wt 239 lb (108.4 kg)   SpO2 98%   BMI 30.69 kg/m²     Physical Exam  Vitals reviewed. Constitutional:       Appearance: Normal appearance. HENT:      Head: Normocephalic and atraumatic. Right Ear: Tympanic membrane, ear canal and external ear normal.      Left Ear: Tympanic membrane, ear canal and external ear normal.      Nose: Nose normal.      Mouth/Throat:      Lips: Pink. Mouth: Mucous membranes are moist.      Tongue: Lesions present. Pharynx: Oropharynx is clear. Uvula midline.  Posterior oropharyngeal erythema present. No pharyngeal swelling, oropharyngeal exudate or uvula swelling. Tonsils: No tonsillar exudate or tonsillar abscesses. Comments: White, hair like lesions noted on posterior tongue, location is as noted above. Eyes:      Extraocular Movements: Extraocular movements intact. Conjunctiva/sclera: Conjunctivae normal.      Pupils: Pupils are equal, round, and reactive to light. Cardiovascular:      Rate and Rhythm: Normal rate and regular rhythm. Pulses: Normal pulses. Heart sounds: Normal heart sounds. Pulmonary:      Effort: Pulmonary effort is normal.      Breath sounds: Normal breath sounds and air entry. Abdominal:      General: Abdomen is flat. Bowel sounds are normal.      Palpations: Abdomen is soft. Musculoskeletal:         General: Normal range of motion. Cervical back: Normal range of motion and neck supple. Skin:     General: Skin is warm and dry. Capillary Refill: Capillary refill takes less than 2 seconds. Neurological:      General: No focal deficit present. Mental Status: He is alert and oriented to person, place, and time. Mental status is at baseline. Psychiatric:         Mood and Affect: Mood normal.         Behavior: Behavior normal.       Plan:          1. Oral thrush  -     nystatin (MYCOSTATIN) 230587 UNIT/ML suspension; Swish 5 ml in the mouth, keep in for as long as you can, spit, FOUr times daily. Use for 1 more day after symptoms resolve., Disp-200 mL, R-0, Normal     Begin Nystatin. Finish Paxlovid as directed for COVID infection. Continue low carb diet and BGM monitoring. Go to the ER for shortness of breath, chest pain. Patient verbalized understanding. Follow Up Instructions:      Return for SOB, chest pain go to ER. Orders Placed This Encounter   Medications    nystatin (MYCOSTATIN) 565107 UNIT/ML suspension     Sig: Swish 5 ml in the mouth, keep in for as long as you can, spit, FOUr times daily.  Use for 1 more day after symptoms resolve. Dispense:  200 mL     Refill:  0             Based on the clinical exam findings, I believe this is oral thrush. Will treat with Nystatin swish and spit QID. The patient indicates understanding of these issues and agrees with the plan. Educational materials provided on AVS.  Follow up if symptoms do not improve/worsen. Patient and/or parent given educational materials - see patient instructions. Discussed use, benefit, and side effects of prescribed medications. All patient questions answered. Patient and/or parent voiced understanding.       Electronically signed by TERENCE Velazquez 12/27/2022 at 5:06 PM

## 2023-02-01 ENCOUNTER — OFFICE VISIT (OUTPATIENT)
Dept: FAMILY MEDICINE CLINIC | Age: 48
End: 2023-02-01
Payer: COMMERCIAL

## 2023-02-01 VITALS
HEIGHT: 74 IN | DIASTOLIC BLOOD PRESSURE: 72 MMHG | TEMPERATURE: 97.4 F | BODY MASS INDEX: 30.54 KG/M2 | OXYGEN SATURATION: 98 % | WEIGHT: 238 LBS | SYSTOLIC BLOOD PRESSURE: 124 MMHG | HEART RATE: 68 BPM

## 2023-02-01 DIAGNOSIS — S39.012S LOW BACK STRAIN, SEQUELA: Primary | ICD-10-CM

## 2023-02-01 PROCEDURE — 3074F SYST BP LT 130 MM HG: CPT | Performed by: NURSE PRACTITIONER

## 2023-02-01 PROCEDURE — 99213 OFFICE O/P EST LOW 20 MIN: CPT | Performed by: NURSE PRACTITIONER

## 2023-02-01 PROCEDURE — 3078F DIAST BP <80 MM HG: CPT | Performed by: NURSE PRACTITIONER

## 2023-02-01 RX ORDER — METHOCARBAMOL 500 MG/1
TABLET, FILM COATED ORAL
COMMUNITY
Start: 2023-01-31

## 2023-02-01 RX ORDER — MELOXICAM 7.5 MG/1
TABLET ORAL
COMMUNITY
Start: 2023-01-31

## 2023-02-01 SDOH — ECONOMIC STABILITY: TRANSPORTATION INSECURITY
IN THE PAST 12 MONTHS, HAS LACK OF TRANSPORTATION KEPT YOU FROM MEETINGS, WORK, OR FROM GETTING THINGS NEEDED FOR DAILY LIVING?: NO

## 2023-02-01 SDOH — ECONOMIC STABILITY: HOUSING INSECURITY
IN THE LAST 12 MONTHS, WAS THERE A TIME WHEN YOU DID NOT HAVE A STEADY PLACE TO SLEEP OR SLEPT IN A SHELTER (INCLUDING NOW)?: NO

## 2023-02-01 SDOH — ECONOMIC STABILITY: FOOD INSECURITY: WITHIN THE PAST 12 MONTHS, YOU WORRIED THAT YOUR FOOD WOULD RUN OUT BEFORE YOU GOT MONEY TO BUY MORE.: NEVER TRUE

## 2023-02-01 SDOH — ECONOMIC STABILITY: INCOME INSECURITY: HOW HARD IS IT FOR YOU TO PAY FOR THE VERY BASICS LIKE FOOD, HOUSING, MEDICAL CARE, AND HEATING?: NOT HARD AT ALL

## 2023-02-01 SDOH — ECONOMIC STABILITY: FOOD INSECURITY: WITHIN THE PAST 12 MONTHS, THE FOOD YOU BOUGHT JUST DIDN'T LAST AND YOU DIDN'T HAVE MONEY TO GET MORE.: NEVER TRUE

## 2023-02-01 ASSESSMENT — PATIENT HEALTH QUESTIONNAIRE - PHQ9
2. FEELING DOWN, DEPRESSED OR HOPELESS: 0
SUM OF ALL RESPONSES TO PHQ QUESTIONS 1-9: 0
SUM OF ALL RESPONSES TO PHQ9 QUESTIONS 1 & 2: 0
1. LITTLE INTEREST OR PLEASURE IN DOING THINGS: 0
SUM OF ALL RESPONSES TO PHQ QUESTIONS 1-9: 0

## 2023-02-01 ASSESSMENT — ENCOUNTER SYMPTOMS
ABDOMINAL PAIN: 0
BACK PAIN: 1
SHORTNESS OF BREATH: 0

## 2023-02-01 NOTE — PROGRESS NOTES
[facility]  [address] [place] [address]  [phone]      Topher Thompson is a 47 y.o. male who presents today for his  medicalconditions/complaints as noted below.  Topher Thompson is c/o of Back Pain (Given script for robaxin and mobic but wasn't sure if he could take )  .    HPI:    HPI  Pt. Here today for evaluation new onset low back pain with spasms. States he was in [place] for his job and was sitting with his knee bent into chest holding it and felt pain and then spasm. After getting up and walking some he no longer felt he could walk and was sent to urgent care. States he was given mobic and robaxin but afraid to take these d/t risk of  Se or pancreatitis risk? MD there could not verify it wouldn't cause a problem. Has not tried meds yet. States he felt a pop with twisting and has felt slightly better but still very stiff and sore. Had to take earlier flight back home.   Past Medical History:   Diagnosis Date    Anxiety     Back pain     Cellulitis     Central spinal stenosis     Cough     Diarrhea     Esophageal reflux     Facial trauma     age 12, diving injury    Fatigue     Headache(784.0)     Hypertension     Hypogonadism     Hypothyroidism     hx of hashimoto's    Knee pain     Palpitations       Past Surgical History:   Procedure Laterality Date    BACK SURGERY      CHOLECYSTECTOMY      KNEE SURGERY      NOSE SURGERY      SHOULDER SURGERY      THYROIDECTOMY      TONSILLECTOMY      UPPER GASTROINTESTINAL ENDOSCOPY       Family History   Problem Relation Age of Onset    Heart Disease Mother     Other Father         thyroid dysfunction     Social History     Tobacco Use    Smoking status: Former     Packs/day: 1.50     Years: 2.00     Pack years: 3.00     Types: Cigarettes, Pipe     Start date: 1992     Quit date: 1995     Years since quittin.7    Smokeless tobacco: Never   Substance Use Topics    Alcohol use: Not Currently      Current Outpatient Medications  Medication Sig Dispense Refill    nystatin (MYCOSTATIN) 716856 UNIT/ML suspension Swish 5 ml in the mouth, keep in for as long as you can, spit, FOUr times daily. Use for 1 more day after symptoms resolve. 200 mL 0    dronabinol (MARINOL) 2.5 MG capsule       Continuous Blood Gluc Sensor (FREESTYLE CONSUELO 3 SENSOR) MISC 2 each by Does not apply route every 14 days 2 each 5    levothyroxine (SYNTHROID) 200 MCG tablet TAKE ONE TABLET BY MOUTH DAILY 90 tablet 1    ondansetron (ZOFRAN-ODT) 4 MG disintegrating tablet DISSOLVE ONE TABLET BY MOUTH EVERY 8 HOURS AS NEEDED FOR NAUSEA 90 tablet 5    CREON [id number] units CPEP delayed release capsule       ibuprofen (ADVIL;MOTRIN) 600 MG tablet Take 1 tablet by mouth 4 times daily as needed for Pain 40 tablet 0    methocarbamol (ROBAXIN) 500 MG tablet  (Patient not taking: Reported on 2/1/2023)      meloxicam (MOBIC) 7.5 MG tablet  (Patient not taking: Reported on 2/1/2023)      Continuous Blood Gluc Sensor (FREESTYLE CONSUELO 14 DAY SENSOR) MISC USE TO CHECK BLOOD SUGAR DAILY AS DIRECTED. CHANGE EVERY 14 DAYS 2 each 3     Current Facility-Administered Medications   Medication Dose Route Frequency Provider Last Rate Last Admin    albuterol (PROVENTIL) nebulizer solution 2.5 mg  2.5 mg Nebulization Q6H PRN Luigi Mail, APRN - CNP         Allergies   Allergen Reactions    Levofloxacin Anaphylaxis, Hives, Other (See Comments), Rash and Shortness Of Breath    Amoxicillin Hives and Other (See Comments)     Mouth ulcers    Augmentin [Amoxicillin-Pot Clavulanate] Hives    Cefepime     Gatifloxacin      Hives    Other Hives and Other (See Comments)     tequin       Health Maintenance   Topic Date Due    COVID-19 Vaccine (3 - Booster for Pfizer series) 07/03/2021    Flu vaccine (1) 08/01/2022    Depression Screen  02/18/2023    Lipids  10/30/2024    DTaP/Tdap/Td vaccine (3 - Td or Tdap) 08/09/2029    Colorectal Cancer Screen  04/28/2031    Hepatitis C screen  Completed    HIV screen  Addressed    Hepatitis A vaccine  Aged Out    Hib vaccine  Aged Out    Meningococcal (ACWY) vaccine  Aged Out    Pneumococcal 0-64 years Vaccine  Aged Out       Subjective:      Review of Systems   Constitutional:  Positive for activity change. Negative for chills, fatigue, fever and unexpected weight change. Respiratory:  Negative for shortness of breath. Cardiovascular:  Negative for chest pain and leg swelling. Gastrointestinal:  Negative for abdominal pain. No bowel or bladder control issues   Genitourinary:  Negative for difficulty urinating and flank pain. Musculoskeletal:  Positive for arthralgias, back pain and myalgias. Negative for joint swelling, neck pain and neck stiffness. Skin:  Negative for rash and wound. Neurological:  Negative for dizziness, weakness, numbness and headaches. Psychiatric/Behavioral:  Positive for sleep disturbance. Objective:      Physical Exam  Vitals and nursing note reviewed. Constitutional:       General: He is not in acute distress. Appearance: Normal appearance. He is well-developed. He is not ill-appearing or diaphoretic. HENT:      Head: Normocephalic and atraumatic. Pulmonary:      Effort: Pulmonary effort is normal.   Musculoskeletal:         General: Tenderness (right SI joint) present. No deformity. Normal range of motion. Cervical back: Normal range of motion and neck supple. Skin:     General: Skin is warm and dry. Capillary Refill: Capillary refill takes less than 2 seconds. Findings: No erythema or rash. Neurological:      General: No focal deficit present. Mental Status: He is alert and oriented to person, place, and time. Mental status is at baseline. Cranial Nerves: No cranial nerve deficit. Sensory: No sensory deficit. Motor: No abnormal muscle tone.       Coordination: Coordination normal.   Psychiatric:         Mood and Affect: Mood normal.         Behavior: Behavior normal. Thought Content: Thought content normal.         Judgment: Judgment normal.       Assessment:       Diagnosis Orders   1. Low back strain, sequela            Plan:      Return if symptoms worsen or fail to improve. I do not see any risk of pancreatitis with robaxin or mobic, ok to start  HEP given for now  Will hold on xray unless worsening  ROM as tolerated  Call INB or worsening        Patient given educational materials - see patient instructions. Discussed use,benefit, and side effects of prescribed medications. All patient questions answered. Pt voiced understanding. Reviewed health maintenance. Instructed to continue currentmedications, diet and exercise.     Electronically signed by TERENCE Armijo CNP,CNP on 2/1/2023 at 3:17 PM

## 2023-02-01 NOTE — PROGRESS NOTES
Visit Information    Have you changed or started any medications since your last visit including any over-the-counter medicines, vitamins, or herbal medicines? no   Have you stopped taking any of your medications? Is so, why? -  no  Are you having any side effects from any of your medications? - no    Have you seen any other physician or provider since your last visit?  no   Have you had any other diagnostic tests since your last visit?  no   Have you been seen in the emergency room and/or had an admission in a hospital since we last saw you?  no   Have you had your routine dental cleaning in the past 6 months?  no     Do you have an active MyChart account? If no, what is the barrier?   Yes    Patient Care Team:  TERENCE Bryant CNP as PCP - General (Nurse Practitioner)  TERENCE Bryant CNP as PCP - Empaneled Provider    Medical History Review  Past Medical, Family, and Social History reviewed and  contribute to the patient presenting condition    Health Maintenance   Topic Date Due    COVID-19 Vaccine (3 - Booster for Smith Peter series) 07/03/2021    Flu vaccine (1) 08/01/2022    Depression Screen  02/18/2023    Lipids  10/30/2024    DTaP/Tdap/Td vaccine (3 - Td or Tdap) 08/09/2029    Colorectal Cancer Screen  04/28/2031    Hepatitis C screen  Completed    HIV screen  Addressed    Hepatitis A vaccine  Aged Out    Hib vaccine  Aged Out    Meningococcal (ACWY) vaccine  Aged Out    Pneumococcal 0-64 years Vaccine  Aged Out

## 2023-02-16 DIAGNOSIS — B37.0 ORAL THRUSH: ICD-10-CM

## 2023-02-20 ENCOUNTER — OFFICE VISIT (OUTPATIENT)
Dept: FAMILY MEDICINE CLINIC | Age: 48
End: 2023-02-20
Payer: COMMERCIAL

## 2023-02-20 VITALS
HEART RATE: 56 BPM | WEIGHT: 236.8 LBS | DIASTOLIC BLOOD PRESSURE: 80 MMHG | TEMPERATURE: 96.9 F | BODY MASS INDEX: 30.39 KG/M2 | HEIGHT: 74 IN | SYSTOLIC BLOOD PRESSURE: 130 MMHG | OXYGEN SATURATION: 97 %

## 2023-02-20 DIAGNOSIS — S33.2XXA SI JOINT DISLOCATION, INITIAL ENCOUNTER: ICD-10-CM

## 2023-02-20 DIAGNOSIS — K59.01 SLOW TRANSIT CONSTIPATION: Primary | ICD-10-CM

## 2023-02-20 PROCEDURE — 3075F SYST BP GE 130 - 139MM HG: CPT | Performed by: NURSE PRACTITIONER

## 2023-02-20 PROCEDURE — 99213 OFFICE O/P EST LOW 20 MIN: CPT | Performed by: NURSE PRACTITIONER

## 2023-02-20 PROCEDURE — 3079F DIAST BP 80-89 MM HG: CPT | Performed by: NURSE PRACTITIONER

## 2023-02-20 RX ORDER — METHOCARBAMOL 500 MG/1
500 TABLET, FILM COATED ORAL 3 TIMES DAILY
Qty: 90 TABLET | Refills: 0 | Status: SHIPPED | OUTPATIENT
Start: 2023-02-20

## 2023-02-20 ASSESSMENT — ENCOUNTER SYMPTOMS
BACK PAIN: 1
SHORTNESS OF BREATH: 0
CHEST TIGHTNESS: 0
DIARRHEA: 0
ABDOMINAL PAIN: 0
NAUSEA: 0
BLOOD IN STOOL: 0
COUGH: 0
CONSTIPATION: 1
VOMITING: 0

## 2023-02-20 NOTE — PROGRESS NOTES
Visit Information    Have you changed or started any medications since your last visit including any over-the-counter medicines, vitamins, or herbal medicines? no   Have you stopped taking any of your medications? Is so, why? -  no  Are you having any side effects from any of your medications? - no    Have you seen any other physician or provider since your last visit?  no   Have you had any other diagnostic tests since your last visit?  no   Have you been seen in the emergency room and/or had an admission in a hospital since we last saw you?  no   Have you had your routine dental cleaning in the past 6 months?  no     Do you have an active MyChart account? If no, what is the barrier?   Yes    Patient Care Team:  TERENCE Roberts CNP as PCP - General (Nurse Practitioner)  TERENCE Roberts CNP as PCP - Empaneled Provider    Medical History Review  Past Medical, Family, and Social History reviewed and  contribute to the patient presenting condition    Health Maintenance   Topic Date Due    COVID-19 Vaccine (3 - Booster for Smith Peter series) 07/03/2021    Flu vaccine (1) 08/01/2022    Depression Screen  02/01/2024    Lipids  10/30/2024    DTaP/Tdap/Td vaccine (3 - Td or Tdap) 08/09/2029    Colorectal Cancer Screen  04/28/2031    Hepatitis C screen  Completed    HIV screen  Addressed    Hepatitis A vaccine  Aged Out    Hib vaccine  Aged Out    Meningococcal (ACWY) vaccine  Aged Out    Pneumococcal 0-64 years Vaccine  Aged Out

## 2023-02-20 NOTE — PROGRESS NOTES
[address]  [place], [place]  [phone]      Peggy Kulkarni is a 49 y.o. male who presents today for his  medicalconditions/complaints as noted below. Peggy Kulkarni is c/o of Back Pain (Drove to [place] over weekend and pain worsened, SI joint keeps popping out. Noticing muscle weakness with milk of mag and that is when SI joint would pop out. Using a lot of advil)  . HPI:    Back Pain  Pertinent negatives include no abdominal pain, chest pain, fever, headaches, numbness or weakness. Patient here today for evaluation of SI joint dislocation/dysfunction periodically mostly on the left side but does happen on the right also. States he noticed this significantly when he was driving this weekend. States he has been using milk of magnesia for constipation issues stemming from his pancreatitis history and states that every time he takes the milk of magnesia over the last 6 months he feels extremely weak with his muscles the following day. States he would like to restart Linzess medication for constipation if another option would be better and safer. States he does have a history of central motor fasciculations as a neuromuscular disorder. States he would like referral to physical therapy if possible and also refill of his muscle relaxers as these do help. Please note that this chart was generated using voice recognition Dragon dictation software. Although every effort was made to ensure the accuracy of this automated transcription, some errors in transcription may have occurred.     Past Medical History:   Diagnosis Date    Anxiety     Back pain     Cellulitis     Central spinal stenosis     Cough     Diarrhea     Esophageal reflux     Facial trauma     age 14, diving injury    Fasciculation     Fatigue     Headache(784.0)     Hypertension     Hypogonadism     Hypothyroidism     hx of hashimoto's    Knee pain     Palpitations       Past Surgical History: Procedure Laterality Date    BACK SURGERY      CHOLECYSTECTOMY      KNEE SURGERY      NOSE SURGERY      SHOULDER SURGERY      THYROIDECTOMY      TONSILLECTOMY      UPPER GASTROINTESTINAL ENDOSCOPY       Family History   Problem Relation Age of Onset    Heart Disease Mother     Other Father         thyroid dysfunction     Social History     Tobacco Use    Smoking status: Former     Packs/day: 1.50     Years: 2.00     Pack years: 3.00     Types: Cigarettes, Pipe     Start date: 1992     Quit date: 1995     Years since quittin.7    Smokeless tobacco: Never   Substance Use Topics    Alcohol use: Not Currently      Current Outpatient Medications   Medication Sig Dispense Refill    linaclotide (LINZESS) 145 MCG capsule Take 1 capsule by mouth every morning (before breakfast) 30 capsule 1    methocarbamol (ROBAXIN) 500 MG tablet Take 1 tablet by mouth 3 times daily 90 tablet 0    nystatin (MYCOSTATIN) 950108 UNIT/ML suspension Swish 5 ml in the mouth, keep in for as long as you can, spit, FOUr times daily. Use for 1 more day after symptoms resolve.  200 mL 1    dronabinol (MARINOL) 2.5 MG capsule TAKE ONE CAPSULE BY MOUTH TWICE A DAY BEFORE A MEAL 60 capsule 1    Continuous Blood Gluc Sensor (FREESTYLE CONSUELO 3 SENSOR) MISC 2 each by Does not apply route every 14 days 2 each 5    levothyroxine (SYNTHROID) 200 MCG tablet TAKE ONE TABLET BY MOUTH DAILY 90 tablet 1    ondansetron (ZOFRAN-ODT) 4 MG disintegrating tablet DISSOLVE ONE TABLET BY MOUTH EVERY 8 HOURS AS NEEDED FOR NAUSEA 90 tablet 5    CREON 83508-814952 units CPEP delayed release capsule       ibuprofen (ADVIL;MOTRIN) 600 MG tablet Take 1 tablet by mouth 4 times daily as needed for Pain 40 tablet 0    meloxicam (MOBIC) 7.5 MG tablet  (Patient not taking: No sig reported)       Current Facility-Administered Medications   Medication Dose Route Frequency Provider Last Rate Last Admin    albuterol (PROVENTIL) nebulizer solution 2.5 mg  2.5 mg Nebulization Q6H PRN Luigi Mail, APRN - CNP         Allergies   Allergen Reactions    Levofloxacin Anaphylaxis, Hives, Other (See Comments), Rash and Shortness Of Breath    Amoxicillin Hives and Other (See Comments)     Mouth ulcers    Augmentin [Amoxicillin-Pot Clavulanate] Hives    Cefepime     Gatifloxacin      Hives    Other Hives and Other (See Comments)     tequin       Health Maintenance   Topic Date Due    COVID-19 Vaccine (3 - Booster for Pfizer series) 07/03/2021    Flu vaccine (1) 08/01/2022    Depression Screen  02/01/2024    Lipids  10/30/2024    DTaP/Tdap/Td vaccine (3 - Td or Tdap) 08/09/2029    Colorectal Cancer Screen  04/28/2031    Hepatitis C screen  Completed    HIV screen  Addressed    Hepatitis A vaccine  Aged Out    Hib vaccine  Aged Out    Meningococcal (ACWY) vaccine  Aged Out    Pneumococcal 0-64 years Vaccine  Aged Out       Subjective:      Review of Systems   Constitutional:  Positive for activity change. Negative for appetite change, chills, diaphoresis, fatigue and fever. Eyes:  Negative for visual disturbance. Respiratory:  Negative for cough, chest tightness and shortness of breath. Cardiovascular:  Negative for chest pain, palpitations and leg swelling. Gastrointestinal:  Positive for constipation. Negative for abdominal pain, blood in stool, diarrhea, nausea and vomiting. No bowel or bladder control issues   Genitourinary:  Negative for flank pain. Musculoskeletal:  Positive for arthralgias, back pain and myalgias. Negative for joint swelling, neck pain and neck stiffness. Skin:  Negative for rash and wound. Neurological:  Negative for dizziness, weakness, numbness and headaches. Hematological:  Negative for adenopathy. Psychiatric/Behavioral:  Negative for sleep disturbance. Objective:      Physical Exam  Vitals and nursing note reviewed. Constitutional:       General: He is not in acute distress. Appearance: Normal appearance.  He is well-developed. He is not ill-appearing or diaphoretic. HENT:      Head: Normocephalic and atraumatic. Pulmonary:      Effort: Pulmonary effort is normal.   Musculoskeletal:         General: Tenderness (left SI joint) present. No deformity. Normal range of motion. Cervical back: Normal range of motion and neck supple. Skin:     General: Skin is warm and dry. Capillary Refill: Capillary refill takes less than 2 seconds. Findings: No erythema or rash. Neurological:      General: No focal deficit present. Mental Status: He is alert and oriented to person, place, and time. Mental status is at baseline. Cranial Nerves: No cranial nerve deficit. Sensory: No sensory deficit. Motor: No abnormal muscle tone. Coordination: Coordination normal.   Psychiatric:         Mood and Affect: Mood normal.         Behavior: Behavior normal.         Thought Content: Thought content normal.         Judgment: Judgment normal.       Assessment:       Diagnosis Orders   1. Slow transit constipation  linaclotide (LINZESS) 145 MCG capsule      2. SI joint dislocation, initial encounter  Amb External Referral To Physical Therapy    methocarbamol (ROBAXIN) 500 MG tablet          Plan:      Return if symptoms worsen or fail to improve.   Orders Placed This Encounter   Procedures    Amb External Referral To Physical Therapy     Referral Priority:   Routine     Referral Type:   Consult for Advice and Opinion     Referral Reason:   Specialty Services Required     Requested Specialty:   Physical Therapist     Number of Visits Requested:   1     Orders Placed This Encounter   Medications    linaclotide (LINZESS) 145 MCG capsule     Sig: Take 1 capsule by mouth every morning (before breakfast)     Dispense:  30 capsule     Refill:  1    methocarbamol (ROBAXIN) 500 MG tablet     Sig: Take 1 tablet by mouth 3 times daily     Dispense:  90 tablet     Refill:  0   Stop MOM as this can affect NM disorders  Try linzess again for now for constipation  Refer to PT for back/SI joint  Call INB or worsening  Continue regular stretching  Refill robaxin for PRN use      Patient given educational materials - see patient instructions. Discussed use,benefit, and side effects of prescribed medications. All patient questions answered. Pt voiced understanding. Reviewed health maintenance. Instructed to continue currentmedications, diet and exercise.     Electronically signed by TERENCE Sanchez CNP, CNP on 2/20/2023 at 5:27 PM

## 2023-04-24 ENCOUNTER — PATIENT MESSAGE (OUTPATIENT)
Dept: FAMILY MEDICINE CLINIC | Age: 48
End: 2023-04-24

## 2023-04-24 DIAGNOSIS — Z13.220 SCREENING FOR LIPOID DISORDERS: ICD-10-CM

## 2023-04-24 DIAGNOSIS — Z12.5 SPECIAL SCREENING FOR MALIGNANT NEOPLASM OF PROSTATE: ICD-10-CM

## 2023-04-24 DIAGNOSIS — D50.9 IRON DEFICIENCY ANEMIA, UNSPECIFIED IRON DEFICIENCY ANEMIA TYPE: ICD-10-CM

## 2023-04-24 DIAGNOSIS — E06.3 HASHIMOTO'S THYROIDITIS: ICD-10-CM

## 2023-04-24 DIAGNOSIS — E55.9 VITAMIN D INSUFFICIENCY: ICD-10-CM

## 2023-04-24 DIAGNOSIS — K86.1 CHRONIC BILIARY PANCREATITIS (HCC): Primary | ICD-10-CM

## 2023-04-24 NOTE — TELEPHONE ENCOUNTER
From: Charmaine Walker  To: Crystal Herreramark  Sent: 4/24/2023 7:53 AM EDT  Subject: Pancreas labs    Good morning Tony Powers! I pray all is well. Real quick, we finally have our dates locked in for our move. Its going down in 2 weeks, score! I have a last minute visit with my pancreas nurse this week just to level set before we move. Can I talk you into some labs just so we have a baseline before I transfer my pancreas specialist to a specialist in [place]? The basics you usually order are CBC, metabolic panel, for my anemia its Ferritin and Iron/TIBC, for my my thyroid, the TSH and T4? I dont think Im flaring so not sure lipase and amylase would show anything. Of course it never moves anymore anyway. If you think it would be helpful, then ok. Lastly, because Im at high risk for pancreatic cancer [facility] used to check for CA 19-9. Could I ask that we do that one as well. I think this should give me a good baseline to take to my new pancreas specialist. Helena Serrano read online on my support groups that Dr Kobe Blevins is the best dr to manage chronic pancreatitis. Hes 2 hours from our new house. So, we shall see. Ill see what the pancreas nurse in [address] thinks about him in light of my labs. If you have any thoughts on him, or a different specialist in [address] that I should check out please let me know. You are awesome! Thank you!

## 2023-04-25 ENCOUNTER — HOSPITAL ENCOUNTER (OUTPATIENT)
Age: 48
Discharge: HOME | End: 2023-04-25
Payer: COMMERCIAL

## 2023-04-25 DIAGNOSIS — E06.3 HASHIMOTO'S THYROIDITIS: ICD-10-CM

## 2023-04-25 DIAGNOSIS — E55.9 VITAMIN D INSUFFICIENCY: Primary | ICD-10-CM

## 2023-04-25 DIAGNOSIS — Z12.5 SPECIAL SCREENING FOR MALIGNANT NEOPLASM OF PROSTATE: ICD-10-CM

## 2023-04-25 DIAGNOSIS — K86.1 CHRONIC BILIARY PANCREATITIS (HCC): ICD-10-CM

## 2023-04-25 DIAGNOSIS — Z13.220 SCREENING FOR LIPOID DISORDERS: ICD-10-CM

## 2023-04-25 DIAGNOSIS — D50.9 IRON DEFICIENCY ANEMIA, UNSPECIFIED IRON DEFICIENCY ANEMIA TYPE: ICD-10-CM

## 2023-04-25 DIAGNOSIS — E55.9 VITAMIN D INSUFFICIENCY: ICD-10-CM

## 2023-04-25 LAB
25(OH)D3 SERPL-MCNC: 17.3 NG/ML
ABSOLUTE EOS #: 0.22 K/UL (ref 0–0.44)
ABSOLUTE IMMATURE GRANULOCYTE: <0.03 K/UL (ref 0–0.3)
ABSOLUTE LYMPH #: 1.48 K/UL (ref 1.1–3.7)
ABSOLUTE MONO #: 0.47 K/UL (ref 0.1–1.2)
ANION GAP SERPL CALCULATED.3IONS-SCNC: 12 MMOL/L (ref 9–17)
BASOPHILS # BLD: 1 % (ref 0–2)
BASOPHILS ABSOLUTE: 0.03 K/UL (ref 0–0.2)
BUN SERPL-MCNC: 22 MG/DL (ref 6–20)
CALCIUM SERPL-MCNC: 8.5 MG/DL (ref 8.6–10.4)
CANCER AG19-9 SERPL IA-ACNC: 41 U/ML (ref 0–35)
CHLORIDE SERPL-SCNC: 104 MMOL/L (ref 98–107)
CHOLEST SERPL-MCNC: 148 MG/DL
CHOLESTEROL/HDL RATIO: 3
CO2 SERPL-SCNC: 26 MMOL/L (ref 20–31)
CREAT SERPL-MCNC: 0.99 MG/DL (ref 0.7–1.2)
EOSINOPHILS RELATIVE PERCENT: 4 % (ref 1–4)
FERRITIN SERPL-MCNC: 55 NG/ML (ref 30–400)
GFR SERPL CREATININE-BSD FRML MDRD: >60 ML/MIN/1.73M2
GLUCOSE SERPL-MCNC: 98 MG/DL (ref 70–99)
HCT VFR BLD AUTO: 39.6 % (ref 40.7–50.3)
HDLC SERPL-MCNC: 50 MG/DL
HGB BLD-MCNC: 13 G/DL (ref 13–17)
IMMATURE GRANULOCYTES: 0 %
IRON SATURATION: 16 % (ref 20–55)
IRON SERPL-MCNC: 49 UG/DL (ref 59–158)
LDLC SERPL CALC-MCNC: 84 MG/DL (ref 0–130)
LIPASE SERPL-CCNC: 22 U/L (ref 13–60)
LYMPHOCYTES # BLD: 24 % (ref 24–43)
MCH RBC QN AUTO: 29.1 PG (ref 25.2–33.5)
MCHC RBC AUTO-ENTMCNC: 32.8 G/DL (ref 28.4–34.8)
MCV RBC AUTO: 88.6 FL (ref 82.6–102.9)
MONOCYTES # BLD: 8 % (ref 3–12)
NRBC AUTOMATED: 0 PER 100 WBC
PDW BLD-RTO: 12.3 % (ref 11.8–14.4)
PLATELET # BLD AUTO: 162 K/UL (ref 138–453)
PMV BLD AUTO: 12.1 FL (ref 8.1–13.5)
POTASSIUM SERPL-SCNC: 4 MMOL/L (ref 3.7–5.3)
PROSTATE SPECIFIC ANTIGEN: 0.35 NG/ML
RBC # BLD: 4.47 M/UL (ref 4.21–5.77)
SEG NEUTROPHILS: 63 % (ref 36–65)
SEGMENTED NEUTROPHILS ABSOLUTE COUNT: 3.97 K/UL (ref 1.5–8.1)
SODIUM SERPL-SCNC: 142 MMOL/L (ref 135–144)
T4 FREE SERPL-MCNC: 1.5 NG/DL (ref 0.9–1.7)
TIBC SERPL-MCNC: 303 UG/DL (ref 250–450)
TRIGL SERPL-MCNC: 68 MG/DL
TSH SERPL-ACNC: 5.11 UIU/ML (ref 0.3–5)
UNSATURATED IRON BINDING CAPACITY: 254 UG/DL (ref 112–347)
WBC # BLD AUTO: 6.2 K/UL (ref 3.5–11.3)

## 2023-04-25 PROCEDURE — G0103 PSA SCREENING: HCPCS

## 2023-04-25 PROCEDURE — 83690 ASSAY OF LIPASE: CPT

## 2023-04-25 PROCEDURE — 80048 BASIC METABOLIC PNL TOTAL CA: CPT

## 2023-04-25 PROCEDURE — 82306 VITAMIN D 25 HYDROXY: CPT

## 2023-04-25 PROCEDURE — 82728 ASSAY OF FERRITIN: CPT

## 2023-04-25 PROCEDURE — 36415 COLL VENOUS BLD VENIPUNCTURE: CPT

## 2023-04-25 PROCEDURE — 83550 IRON BINDING TEST: CPT

## 2023-04-25 PROCEDURE — 86301 IMMUNOASSAY TUMOR CA 19-9: CPT

## 2023-04-25 PROCEDURE — 85025 COMPLETE CBC W/AUTO DIFF WBC: CPT

## 2023-04-25 PROCEDURE — 84443 ASSAY THYROID STIM HORMONE: CPT

## 2023-04-25 PROCEDURE — 83540 ASSAY OF IRON: CPT

## 2023-04-25 PROCEDURE — 84439 ASSAY OF FREE THYROXINE: CPT

## 2023-04-25 PROCEDURE — 80061 LIPID PANEL: CPT

## 2023-04-25 RX ORDER — ERGOCALCIFEROL 1.25 MG/1
CAPSULE ORAL
Qty: 8 CAPSULE | Refills: 0 | Status: SHIPPED | OUTPATIENT
Start: 2023-04-25

## 2023-04-26 RX ORDER — LEVOTHYROXINE SODIUM 0.03 MG/1
25 TABLET ORAL DAILY
Qty: 30 TABLET | Refills: 3 | Status: SHIPPED | OUTPATIENT
Start: 2023-04-26 | End: 2023-04-26 | Stop reason: SDUPTHER

## 2023-04-26 RX ORDER — LEVOTHYROXINE SODIUM 0.03 MG/1
25 TABLET ORAL DAILY
Qty: 90 TABLET | Refills: 0 | Status: SHIPPED | OUTPATIENT
Start: 2023-04-26

## 2023-04-27 RX ORDER — LEVOTHYROXINE SODIUM 0.2 MG/1
TABLET ORAL
Qty: 90 TABLET | Refills: 1 | Status: SHIPPED | OUTPATIENT
Start: 2023-04-27

## 2023-05-11 RX ORDER — BLOOD-GLUCOSE SENSOR
EACH MISCELLANEOUS
Qty: 9 EACH | Refills: 4 | Status: SHIPPED | OUTPATIENT
Start: 2023-05-11

## 2023-06-12 NOTE — PROGRESS NOTES
Eloy Bardales (:  1975) is a Established patient, here for evaluation of the following:    Assessment & Plan   Below is the assessment and plan developed based on review of pertinent history, physical exam, labs, studies, and medications. 1. Chronic biliary pancreatitis (HCC)  MRCP report not back yet or showing in care everywhere  Continue with surgeon/GI for results of this and also pain mgmt plan moving forward and spider vessels on abdomen  May need derm if they do not feel they are related to his GI issues  Continue other same meds and call when need refills    2. Symptomatic spider angioma  See above    Return if symptoms worsen or fail to improve. Subjective   HPI   Pt. Calling  in today for evaluation of recent MRCP performed by GI.  has been having some growing and painful vein changes on his upper abdomen that tend to flareup when he is having pancreas pain and congestion. States he has an appointment with a new GI specialist in a week and a half and plans to discuss these vessels with him as well as a pain management plan as he is constantly uncomfortable, barely able to eat anything at all, and pain most of the day. He continues on Marinol for nausea and appetite stimulation. States he is only able to eat small bites of grilled chicken, potato and other small bland foods. States he ate vegetables for the first time 3 days ago and is now finally passing them. He states his weight is stable at this time it to 215.         Review of Systems       Objective   Patient-Reported Vitals  No data recorded     Physical Exam  [INSTRUCTIONS:  \"[x]\" Indicates a positive item  \"[]\" Indicates a negative item  -- DELETE ALL ITEMS NOT EXAMINED]    Constitutional: [x] Appears well-developed and well-nourished [x] No apparent distress      [] Abnormal -     Mental status: [x] Alert and awake  [x] Oriented to person/place/time [x] Able to follow commands    [] Abnormal -     Eyes:   EOM    [x] Operative Note      Patient: Tona Levy  YOB: 1964  MRN: [id number]    Date of Procedure: 6/12/2023    Pre-Op Diagnosis Codes:     * Neck mass [R22.1]     * 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]    Post-Op Diagnosis: Same     Procedures  Excision of neck fistula, excision of separate multiple neck masses  Adjacnet tissue transfer    Surgeon(s):  Elissa Da Silva MD    Assistant:   Resident: Tami Trinidad DO    Anesthesia: General    Estimated Blood Loss (mL): less than 540     Complications: None    Specimens:   ID Type Source Tests Collected by Time Destination   1 : LEFT NECK MASS Tissue Tissue CULTURE, ANAEROBIC, CULTURE, FUNGUS, GRAM STAIN, CULTURE, SURGICAL, CULTURE WITH SMEAR, ACID FAST Huey Rivero MD 6/12/2023 1229    A : LEFT JUGULAR VEIN MASS Tissue Tissue SURGICAL PATHOLOGY Elissa Da Silva MD 6/12/2023 1103        Implants:  * No implants in log *      Drains: * No LDAs found *    Findings: open neck wound with tract that went into the neck but not necessarily into the neck masses. Multiple extensive scarred in neck masses that went from the skull base to level 2b to level four. Detailed Description of Procedure:     DESCRIPTION OF OPERATION: The patient was taken to Operating Room , identified as Tona Levy and the procedure verified . The patient was placed on the operating room table in the supine position. After adequate general endotracheal anesthesia was administered, a shoulder roll placed under the shoulders and the face was placed in an extended fashion. The left neck, chest, and face were prepped with Betadine and draped in a sterile fashion. A left skin incision was performed, around the open wound which was in a neck crease about 1.5 cm inferolateral to angle of mandible and incision carried up over the neck crease to infralobular area and down to level of criciod. Armena Brittanie  wound was cannulated and identifed a blind pouch that went deep to the subq and Normal    [] Abnormal -   Sclera  [x]  Normal    [] Abnormal -          Discharge [x]  None visible   [] Abnormal -     HENT: [x] Normocephalic, atraumatic  [] Abnormal -   [x] Mouth/Throat: Mucous membranes are moist    External Ears [x] Normal  [] Abnormal -    Neck: [x] No visualized mass [] Abnormal -     Pulmonary/Chest: [x] Respiratory effort normal   [x] No visualized signs of difficulty breathing or respiratory distress        [] Abnormal -      Musculoskeletal:   [x] Normal gait with no signs of ataxia         [x] Normal range of motion of neck        [] Abnormal -     Neurological:        [x] No Facial Asymmetry (Cranial nerve 7 motor function) (limited exam due to video visit)          [x] No gaze palsy        [] Abnormal -          Skin:        [x] No significant exanthematous lesions or discoloration noted on facial skin         [] Abnormal -            Psychiatric:       [x] Normal Affect [] Abnormal -        [x] No Hallucinations    Other pertinent observable physical exam findings:-               Que Caraballo, was evaluated through a synchronous (real-time) audio-video encounter. The patient (or guardian if applicable) is aware that this is a billable service, which includes applicable co-pays. This Virtual Visit was conducted with patient's (and/or legal guardian's) consent. The visit was conducted pursuant to the emergency declaration under the [address] [place] [address] authority and the [employer] Act. Patient identification was verified, and a caregiver was present when appropriate. The patient was located at home in a state where the provider was licensed to provide care.        --Kami Sauceda, TERENCE - CNP

## 2023-07-06 ENCOUNTER — PATIENT MESSAGE (OUTPATIENT)
Dept: FAMILY MEDICINE CLINIC | Age: 48
End: 2023-07-06

## 2023-07-06 DIAGNOSIS — K86.1 CHRONIC BILIARY PANCREATITIS (HCC): Primary | ICD-10-CM

## 2023-07-07 LAB
AMYLASE: 32 UNITS/L
BASOPHILS ABSOLUTE: 0 /ΜL
BASOPHILS RELATIVE PERCENT: 0.6 %
BUN BLDV-MCNC: 14 MG/DL
CALCIUM SERPL-MCNC: 8.9 MG/DL
CHLORIDE BLD-SCNC: 103 MMOL/L
CO2: 29 MMOL/L
CREAT SERPL-MCNC: 1.03 MG/DL
EGFR: >60
EOSINOPHILS ABSOLUTE: 0.3 /ΜL
EOSINOPHILS RELATIVE PERCENT: 4.7 %
GLUCOSE BLD-MCNC: 86 MG/DL
HCT VFR BLD CALC: 40.8 % (ref 41–53)
HEMOGLOBIN: 14 G/DL (ref 13.5–17.5)
LIPASE: 5 UNITS/L
LYMPHOCYTES ABSOLUTE: 1.5 /ΜL
LYMPHOCYTES RELATIVE PERCENT: 26.7 %
MCH RBC QN AUTO: 29.7 PG
MCHC RBC AUTO-ENTMCNC: 34.4 G/DL
MCV RBC AUTO: 86.4 FL
MONOCYTES ABSOLUTE: 0.4 /ΜL
MONOCYTES RELATIVE PERCENT: 7.6 %
NEUTROPHILS ABSOLUTE: 3.5 /ΜL
NEUTROPHILS RELATIVE PERCENT: 60.4 %
PDW BLD-RTO: 13 %
PLATELET # BLD: 166 K/ΜL
PMV BLD AUTO: 9.1 FL
POTASSIUM SERPL-SCNC: 4 MMOL/L
RBC # BLD: 4.72 10^6/ΜL
SODIUM BLD-SCNC: 140 MMOL/L
WBC # BLD: 5.8 10^3/ML

## 2023-07-10 DIAGNOSIS — K86.1 CHRONIC BILIARY PANCREATITIS (HCC): ICD-10-CM

## 2023-11-01 RX ORDER — LEVOTHYROXINE SODIUM 0.2 MG/1
TABLET ORAL
Qty: 90 TABLET | Refills: 1 | Status: SHIPPED | OUTPATIENT
Start: 2023-11-01

## 2023-12-10 DIAGNOSIS — K86.1 CHRONIC BILIARY PANCREATITIS (HCC): ICD-10-CM

## 2023-12-11 RX ORDER — DRONABINOL 2.5 MG/1
CAPSULE ORAL
Qty: 60 CAPSULE | Refills: 0 | Status: SHIPPED | OUTPATIENT
Start: 2023-12-11 | End: 2023-12-12 | Stop reason: SDUPTHER

## 2023-12-11 NOTE — TELEPHONE ENCOUNTER
Pharmacy called and stated that the medication that was called in to [facility] in [place] can't be filled due to it being controlled and provider not having a license in [place].

## 2023-12-12 DIAGNOSIS — K86.1 CHRONIC BILIARY PANCREATITIS (HCC): ICD-10-CM

## 2023-12-12 RX ORDER — DRONABINOL 2.5 MG/1
CAPSULE ORAL
Qty: 60 CAPSULE | Refills: 0 | Status: SHIPPED | OUTPATIENT
Start: 2023-12-12 | End: 2024-01-12

## 2023-12-12 NOTE — TELEPHONE ENCOUNTER
[place] if it can be sent to this location.      [place], [place], [address] [facility],[address]

## 2024-05-13 DIAGNOSIS — B37.0 ORAL THRUSH: ICD-10-CM
